# Patient Record
Sex: FEMALE | URBAN - METROPOLITAN AREA
[De-identification: names, ages, dates, MRNs, and addresses within clinical notes are randomized per-mention and may not be internally consistent; named-entity substitution may affect disease eponyms.]

---

## 2024-01-25 ENCOUNTER — INPATIENT (INPATIENT)
Facility: HOSPITAL | Age: 89
LOS: 3 days | Discharge: HOME | DRG: 592 | End: 2024-01-29
Attending: STUDENT IN AN ORGANIZED HEALTH CARE EDUCATION/TRAINING PROGRAM | Admitting: STUDENT IN AN ORGANIZED HEALTH CARE EDUCATION/TRAINING PROGRAM
Payer: MEDICARE

## 2024-01-25 VITALS
OXYGEN SATURATION: 95 % | RESPIRATION RATE: 14 BRPM | DIASTOLIC BLOOD PRESSURE: 65 MMHG | TEMPERATURE: 98 F | HEART RATE: 60 BPM | SYSTOLIC BLOOD PRESSURE: 113 MMHG

## 2024-01-25 PROCEDURE — 99285 EMERGENCY DEPT VISIT HI MDM: CPT

## 2024-01-25 NOTE — ED ADULT TRIAGE NOTE - CHIEF COMPLAINT QUOTE
Pt sent by family for wound check of the right foot. Family states wound is down to the bone. Pt is bedbound. Pt is nonverbal.

## 2024-01-26 DIAGNOSIS — S91.309A UNSPECIFIED OPEN WOUND, UNSPECIFIED FOOT, INITIAL ENCOUNTER: ICD-10-CM

## 2024-01-26 DIAGNOSIS — Z29.9 ENCOUNTER FOR PROPHYLACTIC MEASURES, UNSPECIFIED: ICD-10-CM

## 2024-01-26 DIAGNOSIS — S91.301A UNSPECIFIED OPEN WOUND, RIGHT FOOT, INITIAL ENCOUNTER: ICD-10-CM

## 2024-01-26 DIAGNOSIS — R41.82 ALTERED MENTAL STATUS, UNSPECIFIED: ICD-10-CM

## 2024-01-26 LAB
ALBUMIN SERPL ELPH-MCNC: 2.9 G/DL — LOW (ref 3.5–5)
ALP SERPL-CCNC: 134 U/L — HIGH (ref 40–120)
ALT FLD-CCNC: 43 U/L DA — SIGNIFICANT CHANGE UP (ref 10–60)
ANION GAP SERPL CALC-SCNC: 5 MMOL/L — SIGNIFICANT CHANGE UP (ref 5–17)
AST SERPL-CCNC: 33 U/L — SIGNIFICANT CHANGE UP (ref 10–40)
BASOPHILS # BLD AUTO: 0.05 K/UL — SIGNIFICANT CHANGE UP (ref 0–0.2)
BASOPHILS NFR BLD AUTO: 0.6 % — SIGNIFICANT CHANGE UP (ref 0–2)
BILIRUB SERPL-MCNC: 0.2 MG/DL — SIGNIFICANT CHANGE UP (ref 0.2–1.2)
BUN SERPL-MCNC: 16 MG/DL — SIGNIFICANT CHANGE UP (ref 7–18)
CALCIUM SERPL-MCNC: 8.9 MG/DL — SIGNIFICANT CHANGE UP (ref 8.4–10.5)
CHLORIDE SERPL-SCNC: 108 MMOL/L — SIGNIFICANT CHANGE UP (ref 96–108)
CO2 SERPL-SCNC: 26 MMOL/L — SIGNIFICANT CHANGE UP (ref 22–31)
CREAT SERPL-MCNC: 0.5 MG/DL — SIGNIFICANT CHANGE UP (ref 0.5–1.3)
EGFR: 90 ML/MIN/1.73M2 — SIGNIFICANT CHANGE UP
EOSINOPHIL # BLD AUTO: 0.1 K/UL — SIGNIFICANT CHANGE UP (ref 0–0.5)
EOSINOPHIL NFR BLD AUTO: 1.3 % — SIGNIFICANT CHANGE UP (ref 0–6)
ERYTHROCYTE [SEDIMENTATION RATE] IN BLOOD: 21 MM/HR — HIGH (ref 0–20)
GLUCOSE SERPL-MCNC: 126 MG/DL — HIGH (ref 70–99)
HCT VFR BLD CALC: 30.9 % — LOW (ref 34.5–45)
HGB BLD-MCNC: 9.8 G/DL — LOW (ref 11.5–15.5)
IMM GRANULOCYTES NFR BLD AUTO: 0.3 % — SIGNIFICANT CHANGE UP (ref 0–0.9)
LIDOCAIN IGE QN: 34 U/L — SIGNIFICANT CHANGE UP (ref 13–75)
LYMPHOCYTES # BLD AUTO: 1.11 K/UL — SIGNIFICANT CHANGE UP (ref 1–3.3)
LYMPHOCYTES # BLD AUTO: 14.1 % — SIGNIFICANT CHANGE UP (ref 13–44)
MCHC RBC-ENTMCNC: 25.8 PG — LOW (ref 27–34)
MCHC RBC-ENTMCNC: 31.7 GM/DL — LOW (ref 32–36)
MCV RBC AUTO: 81.3 FL — SIGNIFICANT CHANGE UP (ref 80–100)
MONOCYTES # BLD AUTO: 0.43 K/UL — SIGNIFICANT CHANGE UP (ref 0–0.9)
MONOCYTES NFR BLD AUTO: 5.5 % — SIGNIFICANT CHANGE UP (ref 2–14)
NEUTROPHILS # BLD AUTO: 6.16 K/UL — SIGNIFICANT CHANGE UP (ref 1.8–7.4)
NEUTROPHILS NFR BLD AUTO: 78.2 % — HIGH (ref 43–77)
NRBC # BLD: 0 /100 WBCS — SIGNIFICANT CHANGE UP (ref 0–0)
PLATELET # BLD AUTO: 234 K/UL — SIGNIFICANT CHANGE UP (ref 150–400)
POTASSIUM SERPL-MCNC: 3.9 MMOL/L — SIGNIFICANT CHANGE UP (ref 3.5–5.3)
POTASSIUM SERPL-SCNC: 3.9 MMOL/L — SIGNIFICANT CHANGE UP (ref 3.5–5.3)
PROT SERPL-MCNC: 6 G/DL — SIGNIFICANT CHANGE UP (ref 6–8.3)
RBC # BLD: 3.8 M/UL — SIGNIFICANT CHANGE UP (ref 3.8–5.2)
RBC # FLD: 14.8 % — HIGH (ref 10.3–14.5)
SODIUM SERPL-SCNC: 139 MMOL/L — SIGNIFICANT CHANGE UP (ref 135–145)
WBC # BLD: 7.87 K/UL — SIGNIFICANT CHANGE UP (ref 3.8–10.5)
WBC # FLD AUTO: 7.87 K/UL — SIGNIFICANT CHANGE UP (ref 3.8–10.5)

## 2024-01-26 PROCEDURE — 73620 X-RAY EXAM OF FOOT: CPT | Mod: 26,RT

## 2024-01-26 PROCEDURE — 99222 1ST HOSP IP/OBS MODERATE 55: CPT | Mod: GC

## 2024-01-26 RX ORDER — ACETAMINOPHEN 500 MG
650 TABLET ORAL EVERY 6 HOURS
Refills: 0 | Status: DISCONTINUED | OUTPATIENT
Start: 2024-01-26 | End: 2024-01-27

## 2024-01-26 RX ORDER — LANOLIN ALCOHOL/MO/W.PET/CERES
3 CREAM (GRAM) TOPICAL AT BEDTIME
Refills: 0 | Status: DISCONTINUED | OUTPATIENT
Start: 2024-01-26 | End: 2024-01-27

## 2024-01-26 RX ORDER — ENOXAPARIN SODIUM 100 MG/ML
40 INJECTION SUBCUTANEOUS EVERY 24 HOURS
Refills: 0 | Status: DISCONTINUED | OUTPATIENT
Start: 2024-01-26 | End: 2024-01-27

## 2024-01-26 RX ORDER — SODIUM CHLORIDE 9 MG/ML
1000 INJECTION, SOLUTION INTRAVENOUS
Refills: 0 | Status: DISCONTINUED | OUTPATIENT
Start: 2024-01-26 | End: 2024-01-27

## 2024-01-26 RX ORDER — ONDANSETRON 8 MG/1
4 TABLET, FILM COATED ORAL EVERY 8 HOURS
Refills: 0 | Status: DISCONTINUED | OUTPATIENT
Start: 2024-01-26 | End: 2024-01-29

## 2024-01-26 RX ADMIN — SODIUM CHLORIDE 60 MILLILITER(S): 9 INJECTION, SOLUTION INTRAVENOUS at 14:56

## 2024-01-26 RX ADMIN — ENOXAPARIN SODIUM 40 MILLIGRAM(S): 100 INJECTION SUBCUTANEOUS at 14:58

## 2024-01-26 NOTE — H&P ADULT - ATTENDING COMMENTS
IMAGING  Right Foot X-Ray  Pending official read; on my read there appears to be mild periosteal reaction appearance at the first metatarsal head.    HPI  88 year old female patient with unknown pmhx, nonverbal who presented to the ER sent by family for wound check of her right foot.  Per Triage note, Family stated wound is down to the bone. Family left without leaving contact information.    Physical Exam  General: Awake, Alert, Tracking with her eyes, Nonverbal, Not following commands  Cardiac: RRR, +3/6 left upper border sternal murmur  Pulmonary: CTA b/l  Abdominal: Soft, ND, NT  Extremities: No edema, Left Foot without ulcers, +1 DP pulses, Right Foot with 3 mm stage 4 ulcer on bottom of first metatarsal head with pus on gauze but clean-based appearance, Contracted extremities    A/P  # Right Plantar First Metatarsal Head Foot Ulcer  Wound is very deep with some purulence on gauze, but appears clean based  - Consult Podiatry  - TRISTEN/PVR and if positive consult Vascular  - Check A1C  - Follow up ESR, CRP  - Will hold off antibiotics as patient is not septic in case bone biopsy may be needed  - Consult Social Work to determine family contact information in order to determine for information about the patient    # DVT PPx  - Lovenox    # FEN  - Speech and Swallow evaluation  - Monitor and replete electrolytes as needed  - IV Fluid Hydration  - Aspiration Precautions    Patient case and management was discussed with ER Attending  I did examine all labs and imaging IMAGING  Right Foot X-Ray  Pending official read; on my read there appears to be mild periosteal reaction appearance at the first metatarsal head.    HPI  88 year old female patient with unknown pmhx, nonverbal who presented to the ER sent by family for wound check of her right foot.  Per Triage note, Family stated wound is down to the bone. Family left without leaving contact information.    Physical Exam  General: Awake, Alert, Tracking with her eyes, Nonverbal, Not following commands  Cardiac: RRR, +3/6 left upper border sternal murmur  Pulmonary: CTA b/l  Abdominal: Soft, ND, NT  Extremities: No edema, Left Foot without ulcers, +1 DP pulses, Right Foot with 3 mm stage 4 ulcer on bottom of first metatarsal head with pus on gauze but clean-based appearance, Contracted extremities    A/P  # Right Plantar First Metatarsal Head Foot Ulcer, r/o Osteomyelitis  Wound is very deep with some purulence on gauze, but appears clean based  - Consult Podiatry  - TRISTEN/PVR and if positive consult Vascular  - Check A1C  - Follow up ESR, CRP  - Will hold off antibiotics as patient is not septic in case bone biopsy may be needed  - Noncontrast MRI of Right Foot to r/o Osteomyelitis  - Consult Social Work to determine family contact information in order to determine for information about the patient    # DVT PPx  - Lovenox    # FEN  - Speech and Swallow evaluation  - Monitor and replete electrolytes as needed  - IV Fluid Hydration  - Aspiration Precautions    Patient case and management was discussed with ER Attending  I did examine all labs and imaging IMAGING  Right Foot X-Ray  Pending official read; on my read there appears to be mild periosteal reaction appearance at the first metatarsal head.    HPI  88 year old female patient with unknown pmhx, nonverbal who presented to the ER sent by family for wound check of her right foot.  Per Triage note, Family stated wound is down to the bone. Family left without leaving contact information.    Physical Exam  General: Awake, Alert, Tracking with her eyes, Nonverbal, Not following commands  Cardiac: RRR, +3/6 left upper border sternal murmur  Pulmonary: CTA b/l  Abdominal: Soft, ND, NT  Extremities: No edema, Left Foot without ulcers, +1 DP pulses, Right Foot with 3 mm stage 4 ulcer on bottom of first metatarsal head with pus on gauze but clean-based appearance, Contracted extremities    A/P  # Right Plantar First Metatarsal Head Foot Ulcer, r/o Osteomyelitis  Wound is very deep with some purulence on gauze, but appears clean based  - Consult Podiatry  - TRISTEN/PVR and if positive consult Vascular  - Check A1C  - Follow up ESR, CRP  - Will hold off antibiotics as patient is not septic in case bone biopsy may be needed  - Noncontrast MRI of Right Foot to r/o Osteomyelitis  - Consult Social Work to determine family contact information in order to determine more information about the patient    # DVT PPx  - Lovenox    # FEN  - Speech and Swallow evaluation  - Monitor and replete electrolytes as needed  - IV Fluid Hydration  - Aspiration Precautions    Patient case and management was discussed with ER Attending  I did examine all labs and imaging

## 2024-01-26 NOTE — H&P ADULT - HISTORY OF PRESENT ILLNESS
88F with unknown PMH presented to the ED with a foot wound. Pt seen at bedside, AAOX0, nonverbal. As per ED chart and signout, pt was dropped off by family but there is not contact info for the family that was given. Pt appears comfortable, afeb, VSS Unable to assess ROS due to mental status

## 2024-01-26 NOTE — CHART NOTE - NSCHARTNOTEFT_GEN_A_CORE
NP received call from [facility] Pharmacist-Caludia with recommendation for Lovenox 30mg QD.  NP verbalized understanding and informed Attending. NP received call from [facility] Marielos with recommendation for Lovenox 30mg QD.  NP verbalized understanding and informed Attending.

## 2024-01-26 NOTE — PROGRESS NOTE ADULT - SUBJECTIVE AND OBJECTIVE BOX
HPI:  88F with unknown PMH presented to the ED with a foot wound. Pt seen at bedside, AAOX0, nonverbal. As per ED chart and signout, pt was dropped off by family but there is not contact info for the family that was given. Pt appears comfortable, afeb. Unable to obtain full history due to mental status (26 Jan 2024 06:13)      Podiatry HPI    PMH:  Allergies: Diucardin (Unknown)      Medications acetaminophen     Tablet .. 650 milliGRAM(s) Oral every 6 hours PRN  aluminum hydroxide/magnesium hydroxide/simethicone Suspension 30 milliLiter(s) Oral every 4 hours PRN  enoxaparin Injectable 40 milliGRAM(s) SubCutaneous every 24 hours  lactated ringers. 1000 milliLiter(s) IV Continuous <Continuous>  melatonin 3 milliGRAM(s) Oral at bedtime PRN  ondansetron Injectable 4 milliGRAM(s) IV Push every 8 hours PRN    FH,   PMH   PSH    Labs                          9.8    7.87  )-----------( 234      ( 26 Jan 2024 04:34 )             30.9      01-26    139  |  108  |  16  ----------------------------<  126<H>  3.9   |  26  |  0.50    Ca    8.9      26 Jan 2024 04:34    TPro  6.0  /  Alb  2.9<L>  /  TBili  0.2  /  DBili  x   /  AST  33  /  ALT  43  /  AlkPhos  134<H>  01-26     Vital Signs Last 24 Hrs  T(C): 36.6 (26 Jan 2024 11:15), Max: 36.8 (25 Jan 2024 20:26)  T(F): 97.9 (26 Jan 2024 11:15), Max: 98.3 (25 Jan 2024 20:26)  HR: 58 (26 Jan 2024 11:15) (58 - 65)  BP: 161/64 (26 Jan 2024 11:15) (113/65 - 161/64)  BP(mean): --  RR: 17 (26 Jan 2024 11:15) (14 - 18)  SpO2: 96% (26 Jan 2024 11:15) (95% - 96%)    Parameters below as of 26 Jan 2024 11:15  Patient On (Oxygen Delivery Method): room air      Sedimentation Rate, Erythrocyte: 21 mm/Hr (01-26-24 @ 04:34)          WBC Count: 7.87 K/uL (01-26-24 @ 04:34)      PHYSICAL EXAM  LE Focused:    Derm: Wound to the 1st R submet measuring 2x2cm. Fibrogranular wound bed. Does not probe to bone. No erythema. No drainage or purulence.  Vasc: Faintly palpable pulses. Skin temperature noted to be warm to warm from proximal to distal b/l.   Neuro: Protective and epicritic sensation grossly intact   Musc: Contracted b/l LE. Muscle test deferred.     A:  1st  submet wound    P:   Patient seen and evaluated at bedside and Chart reviewed.  No leukocytosis with vitals stable.  Discussed diagnosis and treatment with patient  X-rays evaluated-final read  Obtained wound culture and sent to Lab  Dressed with santyl and DSD.  Patient to keep the dressing clean, dry and intact  Discussed importance of daily foot examination and proper shoe gear.  Recommended ID consult   Recommended Vasc consult if TRISTEN/PVR abnormal for healing potential of wound.   Ordered TRISTEN/TBI/PVR   Ordered HbA1C  Ordered ESR/CRP  Discussed importance of daily foot examinations and proper shoe gear and to importance of lower Fasting Blood Glucose levels.   Podiatry to follow while in house.  Discussed with Dr. Hale

## 2024-01-26 NOTE — H&P ADULT - PROBLEM SELECTOR PLAN 1
presenting with 2-3mm stage 4 ulcer on plantar aspect of right foot metatarsal with purulent drainage  unable to obtain collateral history, no contact info for family  afeb, VSS on admission  WBC 7.87  Podiatry consulted  will hold abx for now  f/u ESR/CRP  f/u MRI right  f/u TRISTEN/PVR  f/u A1c  gentle hydration, NPO for now due to mental status, dysphagia screen

## 2024-01-26 NOTE — H&P ADULT - NSHPPHYSICALEXAM_GEN_ALL_CORE
General - NAD, lying in bed comfortably, appears comfortable, nonverbal  Eyes - PERRLA, EOM intact  ENT - Nonicteric sclerae, PERRLA, EOMI. Oropharynx clear. Moist mucous membranes. Conjunctivae appear well perfused.   Neck - No noticeable or palpable swelling, redness or rash around throat or on face  Lymph Nodes - No lymphadenopathy  Cardiovascular - (+) systolic murmur heard. RRR.  no JVD, no carotid bruits  Lungs - Clear to auscultation, no use of accessory muscles, no crackles or wheezes.  Skin - (+) 1st metatarsal, plantar aspect of right foot ulcer, 2-3mm, stage 4, with purulent drainage.   Abdomen - Normal bowel sounds, abdomen soft and nontender  Rectal – Rectal exam not performed since no symptoms indicated blood loss.  Extremities - No edema, cyanosis or clubbing  Musculoskeletal - 5/5 strength, normal range of motion, no swollen or erythematous joints.  Neuro– Alert and oriented x 3, CN 2-12 grossly intact.

## 2024-01-26 NOTE — CONSULT NOTE ADULT - SUBJECTIVE AND OBJECTIVE BOX
HPI:  88F with unknown PMH presented to the ED with a foot wound. Pt seen at bedside, AAOX0, nonverbal. As per ED chart and signout, pt was dropped off by family but there is not contact info for the family that was given. Pt appears comfortable, afeb. Unable to obtain full history due to mental status (26 Jan 2024 06:13)      Podiatry HPI    PMH:  Allergies: Diucardin (Unknown)      Medications acetaminophen     Tablet .. 650 milliGRAM(s) Oral every 6 hours PRN  aluminum hydroxide/magnesium hydroxide/simethicone Suspension 30 milliLiter(s) Oral every 4 hours PRN  enoxaparin Injectable 40 milliGRAM(s) SubCutaneous every 24 hours  lactated ringers. 1000 milliLiter(s) IV Continuous <Continuous>  melatonin 3 milliGRAM(s) Oral at bedtime PRN  ondansetron Injectable 4 milliGRAM(s) IV Push every 8 hours PRN    FH,   PMH   PSH    Labs                          9.8    7.87  )-----------( 234      ( 26 Jan 2024 04:34 )             30.9      01-26    139  |  108  |  16  ----------------------------<  126<H>  3.9   |  26  |  0.50    Ca    8.9      26 Jan 2024 04:34    TPro  6.0  /  Alb  2.9<L>  /  TBili  0.2  /  DBili  x   /  AST  33  /  ALT  43  /  AlkPhos  134<H>  01-26     Vital Signs Last 24 Hrs  T(C): 36.6 (26 Jan 2024 11:15), Max: 36.8 (25 Jan 2024 20:26)  T(F): 97.9 (26 Jan 2024 11:15), Max: 98.3 (25 Jan 2024 20:26)  HR: 58 (26 Jan 2024 11:15) (58 - 65)  BP: 161/64 (26 Jan 2024 11:15) (113/65 - 161/64)  BP(mean): --  RR: 17 (26 Jan 2024 11:15) (14 - 18)  SpO2: 96% (26 Jan 2024 11:15) (95% - 96%)    Parameters below as of 26 Jan 2024 11:15  Patient On (Oxygen Delivery Method): room air      Sedimentation Rate, Erythrocyte: 21 mm/Hr (01-26-24 @ 04:34)          WBC Count: 7.87 K/uL (01-26-24 @ 04:34)      PHYSICAL EXAM  LE Focused:    Derm: Wound to the 1st R submet measuring 2x2cm. Fibrogranular wound bed. Does not probe to bone. No erythema. No drainage or purulence.  Vasc: Faintly palpable pulses. Skin temperature noted to be warm to warm from proximal to distal b/l.   Neuro: Protective and epicritic sensation grossly intact   Musc: Contracted b/l LE. Muscle test deferred.     A:  1st  submet wound    P:   Patient seen and evaluated at bedside and Chart reviewed.  No leukocytosis with vitals stable.  Discussed diagnosis and treatment with patient  X-rays evaluated-final read  Obtained wound culture and sent to Lab  Dressed with santyl and DSD.  Patient to keep the dressing clean, dry and intact  Discussed importance of daily foot examination and proper shoe gear.  Recommended ID consult   Recommended Vasc consult if TRISTEN/PVR abnormal for healing potential of wound.   Ordered TRISTEN/TBI/PVR   Ordered HbA1C  Ordered ESR/CRP  Discussed importance of daily foot examinations and proper shoe gear and to importance of lower Fasting Blood Glucose levels.   Podiatry to follow while in house.  Seen with Dr. Hale

## 2024-01-26 NOTE — CHART NOTE - NSCHARTNOTEFT_GEN_A_CORE
EVENT:  Pt seen and examined     SUBJECTIVE: Mouthed hello, nonverbal, edentulous, w/ LE contracture.      OBJECTIVE  REVIEW OF SYSTEMS:  Limited exam in nonverbal pt     CONSTITUTIONAL: No fever  EYES: No acute visual disturbances  NECK: No pain or stiffness  RESPIRATORY: No cough; No shortness of breath  CARDIOVASCULAR: No chest pain, no palpitations  GASTROINTESTINAL: No pain. No nausea or vomiting.  No diarrhea   NEUROLOGICAL: No headache or numbness, no tremors  MUSCULOSKELETAL: No joint pain, no muscle pain  GENITOURINARY: No dysuria, no frequency, no hesitancy  PSYCHIATRY: No depression, no anxiety  ALL OTHER  ROS negative      PHYSICAL EXAM: Limited exam in nonverbal pt   GENERAL: NAD, Fail appearing   HEENT: Normocephalic;  conjunctivae and sclerae clear; moist mucous membranes; Edentulous.    NECK : Supple  CHEST/LUNG: Clear to auscultation bilaterally with good air entry   HEART: S1 S2  regular; no murmurs, gallops or rubs  ABDOMEN: Soft, Nontender, Nondistended; Bowel sounds present x 4 quad  EXTREMITIES: No cyanosis; no edema; no calf tenderness.  (+) LE contracture.  (+) R. Foot wound w/ peggy dsg, no drainage noted.    SKIN: Warm and dry; no rash  NERVOUS SYSTEM:  Aroused to touch       Vital Signs Last 24 Hrs  T(C): 36.8 (26 Jan 2024 12:35), Max: 36.8 (25 Jan 2024 20:26)  T(F): 98.3 (26 Jan 2024 12:35), Max: 98.3 (25 Jan 2024 20:26)  HR: 65 (26 Jan 2024 12:35) (58 - 65)  BP: 152/65 (26 Jan 2024 12:35) (113/65 - 161/64)  RR: 17 (26 Jan 2024 12:35) (14 - 18)  SpO2: 98% (26 Jan 2024 12:35) (95% - 98%)    Parameters below as of 26 Jan 2024 12:35  Patient On (Oxygen Delivery Method): room air        LABS:                        9.8    7.87  )-----------( 234      ( 26 Jan 2024 04:34 )             30.9     01-26    139  |  108  |  16  ----------------------------<  126<H>  3.9   |  26  |  0.50    Ca    8.9      26 Jan 2024 04:34    TPro  6.0  /  Alb  2.9<L>  /  TBili  0.2  /  DBili  x   /  AST  33  /  ALT  43  /  AlkPhos  134<H>  01-26      EKG:   IMAGING:    ASSESSMENT:  88 year old, Female, with unknown PMH.  Presented to the ED, reportedly dropped off by family member for right foot wound.  Admitted for Right  foot ulcer.      Med req needed-f/u med rec        Problem/Plan - 1:  ·  Problem: Open wound of right foot.   ·  Plan:   - P/w 2-3mm stage 4 ulcer on plantar aspect of right foot metatarsal with purulent drainage  - No family available for collateral history   - Xray showed Possible osteomyelitis, first metatarsal  - C/w wound care:  ---------------Dressed with santyl and DSD.  Podiatry to follow while in house.  - ABIs pending-f/u results   - Repeat ESR pending-f/u results   - CRP pending-f/u results   - MR pending-no family available to complete safety form  - Pod-Dr. Hale consulted, appreciated-Recommended ID consult        Problem/Plan - 2:  ·  Problem: AMS (altered mental status).   ·  Plan:   - P/w AAOx0, nonverbal on admission, unknown baseline mental status.  No family available for collateral history-Pt reportedly dropped off in ED by family member.  - C/w NPO d/t mental status.  C/w IVF.  Reassess in AM  - Awaiting dysphagia screen-f/u results   - SW consult pending-f/u recommendations     Problem/Plan - 3:  ·  Problem: Prophylactic measure.   ·  Plan:   - C/w Lovenox for DVT ppx EVENT:  Pt seen and examined     SUBJECTIVE: Mouthed hello, nonverbal, edentulous, w/ LE contracture.      OBJECTIVE  REVIEW OF SYSTEMS:  Limited exam in nonverbal pt     CONSTITUTIONAL: No fever  EYES: No acute visual disturbances  NECK: No pain or stiffness  RESPIRATORY: No cough; No shortness of breath  CARDIOVASCULAR: No chest pain, no palpitations  GASTROINTESTINAL: No pain. No nausea or vomiting.  No diarrhea   NEUROLOGICAL: No headache or numbness, no tremors  MUSCULOSKELETAL: No joint pain, no muscle pain  GENITOURINARY: No dysuria, no frequency, no hesitancy  PSYCHIATRY: No depression, no anxiety  ALL OTHER  ROS negative      PHYSICAL EXAM: Limited exam in nonverbal pt   GENERAL: NAD, Fail appearing   HEENT: Normocephalic;  conjunctivae and sclerae clear; moist mucous membranes; Edentulous.    NECK : Supple  CHEST/LUNG: Clear to auscultation bilaterally with good air entry   HEART: S1 S2  regular; no murmurs, gallops or rubs  ABDOMEN: Soft, Nontender, Nondistended; Bowel sounds present x 4 quad  EXTREMITIES: No cyanosis; no edema; no calf tenderness.  (+) LE contracture.  (+) R. Foot wound w/ peggy dsg, no drainage noted.    SKIN: Warm and dry; no rash  NERVOUS SYSTEM:  Aroused to touch       Vital Signs Last 24 Hrs  T(C): 36.8 (26 Jan 2024 12:35), Max: 36.8 (25 Jan 2024 20:26)  T(F): 98.3 (26 Jan 2024 12:35), Max: 98.3 (25 Jan 2024 20:26)  HR: 65 (26 Jan 2024 12:35) (58 - 65)  BP: 152/65 (26 Jan 2024 12:35) (113/65 - 161/64)  RR: 17 (26 Jan 2024 12:35) (14 - 18)  SpO2: 98% (26 Jan 2024 12:35) (95% - 98%)    Parameters below as of 26 Jan 2024 12:35  Patient On (Oxygen Delivery Method): room air        LABS:                        9.8    7.87  )-----------( 234      ( 26 Jan 2024 04:34 )             30.9     01-26    139  |  108  |  16  ----------------------------<  126<H>  3.9   |  26  |  0.50    Ca    8.9      26 Jan 2024 04:34    TPro  6.0  /  Alb  2.9<L>  /  TBili  0.2  /  DBili  x   /  AST  33  /  ALT  43  /  AlkPhos  134<H>  01-26      EKG:   IMAGING:    ASSESSMENT:  88 year old, Female, with unknown PMH.  Presented to the ED, reportedly dropped off by family member for right foot wound.  Admitted for Right foot ulcer.      Med req needed-f/u med rec        Problem/Plan - 1:  ·  Problem: Open wound of right foot.   ·  Plan:   - P/w 2-3mm stage 4 ulcer on plantar aspect of right foot metatarsal with purulent drainage  - No family available for collateral history   - Xray showed Possible osteomyelitis, first metatarsal  - C/w wound care:  ---------------Dressed with santyl and DSD.  Podiatry to follow while in house.  - ABIs pending-f/u results   - Repeat ESR pending-f/u results   - CRP pending-f/u results   - MR pending-no family available to complete safety form  - Pod-Dr. Hale consulted, appreciated-Recommended ID consult        Problem/Plan - 2:  ·  Problem: AMS (altered mental status).   ·  Plan:   - P/w AAOx0, nonverbal on admission, unknown baseline mental status.  No family available for collateral history-Pt reportedly dropped off in ED by family member.  - C/w NPO d/t mental status.  C/w IVF.  Reassess in AM  - Awaiting dysphagia screen-f/u results   - SW consult pending-f/u recommendations     Problem/Plan - 3:  ·  Problem: Prophylactic measure.   ·  Plan:   - C/w Lovenox for DVT ppx EVENT:  Pt seen and examined     SUBJECTIVE: Mouthed hello, nonverbal, edentulous, w/ LE contracture.      OBJECTIVE  REVIEW OF SYSTEMS:  Limited exam in nonverbal pt     CONSTITUTIONAL: No fever  EYES: No acute visual disturbances  NECK: No pain or stiffness  RESPIRATORY: No cough; No shortness of breath  CARDIOVASCULAR: No chest pain, no palpitations  GASTROINTESTINAL: No pain. No nausea or vomiting.  No diarrhea   NEUROLOGICAL: No headache or numbness, no tremors  MUSCULOSKELETAL: No joint pain, no muscle pain  GENITOURINARY: No dysuria, no frequency, no hesitancy  PSYCHIATRY: No depression, no anxiety  ALL OTHER  ROS negative      PHYSICAL EXAM: Limited exam in nonverbal pt   GENERAL: NAD, Fail appearing   HEENT: Normocephalic;  conjunctivae and sclerae clear; moist mucous membranes; Edentulous.    NECK : Supple  CHEST/LUNG: Clear to auscultation bilaterally with good air entry   HEART: S1 S2  regular; no murmurs, gallops or rubs  ABDOMEN: Soft, Nontender, Nondistended; Bowel sounds present x 4 quad  EXTREMITIES: No cyanosis; no edema; no calf tenderness.  (+) LE contracture.  (+) R. Foot wound w/ peggy dsg, no drainage noted.    SKIN: Warm and dry; no rash  NERVOUS SYSTEM:  Aroused to touch       Vital Signs Last 24 Hrs  T(C): 36.8 (26 Jan 2024 12:35), Max: 36.8 (25 Jan 2024 20:26)  T(F): 98.3 (26 Jan 2024 12:35), Max: 98.3 (25 Jan 2024 20:26)  HR: 65 (26 Jan 2024 12:35) (58 - 65)  BP: 152/65 (26 Jan 2024 12:35) (113/65 - 161/64)  RR: 17 (26 Jan 2024 12:35) (14 - 18)  SpO2: 98% (26 Jan 2024 12:35) (95% - 98%)    Parameters below as of 26 Jan 2024 12:35  Patient On (Oxygen Delivery Method): room air        LABS:                        9.8    7.87  )-----------( 234      ( 26 Jan 2024 04:34 )             30.9     01-26    139  |  108  |  16  ----------------------------<  126<H>  3.9   |  26  |  0.50    Ca    8.9      26 Jan 2024 04:34    TPro  6.0  /  Alb  2.9<L>  /  TBili  0.2  /  DBili  x   /  AST  33  /  ALT  43  /  AlkPhos  134<H>  01-26      EKG:   IMAGING:    ASSESSMENT:  88 year old, Female, with unknown PMH.  Presented to the ED, reportedly dropped off by family member for right foot wound.  Admitted for Right foot ulcer.      Med req needed-f/u med rec        Problem/Plan - 1:  ·  Problem: Open wound of right foot.   ·  Plan:   - P/w 2-3mm stage 4 ulcer on plantar aspect of right foot metatarsal with purulent drainage  - No family available for collateral history   - Xray showed possible osteomyelitis, first metatarsal  - C/w wound care:  ---------------Dressed with santyl and DSD.  Podiatry to follow while in house.  - ABIs pending-f/u results   - Repeat ESR pending-f/u results   - CRP pending-f/u results   - MR pending-no family available to complete safety form  - Pod-Dr. Hale consulted, appreciated-Recommended ID consult        Problem/Plan - 2:  ·  Problem: AMS (altered mental status).   ·  Plan:   - P/w AAOx0, nonverbal on admission, unknown baseline mental status.  No family available for collateral history-Pt reportedly dropped off in ED by family member.  - C/w NPO d/t mental status.  C/w IVF.  Reassess in AM  - Awaiting dysphagia screen-f/u results   - SW consult pending-f/u recommendations     Problem/Plan - 3:  ·  Problem: Prophylactic measure.   ·  Plan:   - C/w Lovenox for DVT ppx EVENT:  Pt seen and examined     SUBJECTIVE: Mouthed hello, nonverbal, edentulous, w/ LE contracture.      OBJECTIVE  REVIEW OF SYSTEMS:  Limited exam in nonverbal pt     CONSTITUTIONAL: No fever  EYES: No acute visual disturbances  NECK: No pain or stiffness  RESPIRATORY: No cough; No shortness of breath  CARDIOVASCULAR: No chest pain, no palpitations  GASTROINTESTINAL: No pain. No nausea or vomiting.  No diarrhea   NEUROLOGICAL: No headache or numbness, no tremors  MUSCULOSKELETAL: No joint pain, no muscle pain  GENITOURINARY: No dysuria, no frequency, no hesitancy  PSYCHIATRY: No depression, no anxiety  ALL OTHER  ROS negative      PHYSICAL EXAM: Limited exam in nonverbal pt   GENERAL: NAD, Fail appearing   HEENT: Normocephalic;  conjunctivae and sclerae clear; moist mucous membranes; Edentulous.    NECK : Supple  CHEST/LUNG: Clear to auscultation bilaterally with good air entry   HEART: S1 S2  regular; no murmurs, gallops or rubs  ABDOMEN: Soft, Nontender, Nondistended; Bowel sounds present x 4 quad  EXTREMITIES: No cyanosis; no edema; no calf tenderness.  (+) LE contracture.  (+) R. Foot wound w/ peggy dsg, no drainage noted.    SKIN: Warm and dry; (+) Multiple pressure ulcers   NERVOUS SYSTEM:  Aroused to touch       Vital Signs Last 24 Hrs  T(C): 36.8 (26 Jan 2024 12:35), Max: 36.8 (25 Jan 2024 20:26)  T(F): 98.3 (26 Jan 2024 12:35), Max: 98.3 (25 Jan 2024 20:26)  HR: 65 (26 Jan 2024 12:35) (58 - 65)  BP: 152/65 (26 Jan 2024 12:35) (113/65 - 161/64)  RR: 17 (26 Jan 2024 12:35) (14 - 18)  SpO2: 98% (26 Jan 2024 12:35) (95% - 98%)    Parameters below as of 26 Jan 2024 12:35  Patient On (Oxygen Delivery Method): room air        LABS:                        9.8    7.87  )-----------( 234      ( 26 Jan 2024 04:34 )             30.9     01-26    139  |  108  |  16  ----------------------------<  126<H>  3.9   |  26  |  0.50    Ca    8.9      26 Jan 2024 04:34    TPro  6.0  /  Alb  2.9<L>  /  TBili  0.2  /  DBili  x   /  AST  33  /  ALT  43  /  AlkPhos  134<H>  01-26      EKG:   IMAGING:    ASSESSMENT:  88 year old, Female, with unknown PMH.  Presented to the ED, reportedly dropped off by family member for right foot wound.  Admitted for Right foot ulcer.      Med req needed-f/u med rec        Problem/Plan - 1:  ·  Problem: Open wound of right foot.   ·  Plan:   - P/w 2-3mm stage 4 ulcer on plantar aspect of right foot metatarsal with purulent drainage  - No family available for collateral history   - Xray showed possible osteomyelitis, first metatarsal  - C/w wound care:  ---------------Dressed with santyl and DSD.  Podiatry to follow while in house.  - ABIs pending-f/u results   - Repeat ESR pending-f/u results   - CRP pending-f/u results   - MR pending-no family available to complete safety form  - Pod-Dr. Hale consulted, appreciated-Recommended ID consult      Problem/Plan - 2:  ·  Problem: Pressure ulcers.   ·  Plan:   - P/w Multiple pressure ulcers  - Continue to off load  - Wound care consult pending-f/u recommendations      Problem/Plan - 3:  ·  Problem: AMS (altered mental status).   ·  Plan:   - P/w AAOx0, nonverbal on admission, unknown baseline mental status.  No family available for collateral history-Pt reportedly dropped off in ED by family member.  - C/w NPO d/t mental status.  C/w IVF.  Reassess in AM  - Awaiting dysphagia screen-f/u results   - SW consult pending-f/u recommendations     Problem/Plan - 4:  ·  Problem: Prophylactic measure.   ·  Plan:   - C/w Lovenox for DVT ppx

## 2024-01-26 NOTE — ED ADULT NURSE NOTE - NSFALLRISKINTERV_ED_ALL_ED

## 2024-01-26 NOTE — PATIENT PROFILE ADULT - FALL HARM RISK - HARM RISK INTERVENTIONS
Assistance with ambulation/Assistance OOB with selected safe patient handling equipment/Communicate Risk of Fall with Harm to all staff/Monitor for mental status changes/Monitor gait and stability/Reinforce activity limits and safety measures with patient and family/Reorient to person, place and time as needed/Review medications for side effects contributing to fall risk/Sit up slowly, dangle for a short time, stand at bedside before walking/Tailored Fall Risk Interventions/Use of alarms - bed, chair and/or voice tab/Visual Cue: Yellow wristband and red socks/Bed in lowest position, wheels locked, appropriate side rails in place/Call bell, personal items and telephone in reach/Instruct patient to call for assistance before getting out of bed or chair/Non-slip footwear when patient is out of bed/[place] to call system/Physically safe environment - no spills, clutter or unnecessary equipment/Purposeful Proactive Rounding/Room/bathroom lighting operational, light cord in reach/Unable to comprehend

## 2024-01-26 NOTE — ED ADULT NURSE REASSESSMENT NOTE - NS ED NURSE REASSESS COMMENT FT1
Pt endorsed from MEDARDO Gordillo, evaluated for wound check, pt in no acute distress, appears comfortable, alert but non verbal, breathes freely on room air, safety precautions inplace, vss, pending admission, nursing care continues. Pt endorsed from MEDARDO Gordillo, evaluated for wound check, pt in no acute distress, appears comfortable, alert but non verbal, breathes freely on room air,  wound noted to right foot, no drainage or odour with light pink bed wound, dressing applied, skin otherwise intact, safety precautions inplace, vss, pending admission, nursing care continues.

## 2024-01-26 NOTE — ED ADULT NURSE NOTE - NS ED NURSE RECORD ANOTHER HT AND WT
ULTRASOUND ABDOMEN LIMITED:

(RIGHT UPPER QUADRANT)

 

DATE 7/6/18.

 

HISTORY: 

A 53-year-old male with elevated liver function tests.

 

FINDINGS: 

Gallbladder: Surgically absent.

Common duct: 5 mm.

Liver: Diffusely increased echogenicity, but without significant signal dropout in the deep portions 
of the right lobe.  

Pancreas: Very poorly visualized due to a combination of shadowing from bowel gas and body habitus.

Right kidney: No hydronephrosis.

 

IMPRESSION: 

1.  Questionable hepatic steatosis.

 

2.  Status post cholecystectomy.

 

3.  Pancreas not visualized.

 

 

JN R

 

POS: TPC Yes

## 2024-01-26 NOTE — ED PROVIDER NOTE - OBJECTIVE STATEMENT
88-year-old female unknown medical hx (no documentation in chart, family not at bedside and no contact phone numbers available) sent in by family from home for a foot wound. Unknown how long this has been present. Patient is nonverbal, bedbound, contracted, unable to provide hx.

## 2024-01-26 NOTE — H&P ADULT - ASSESSMENT
88F with unknown PMH presented to the ED with a foot wound admitted for foot ulcer 88F with unknown PMH presented to the ED with a foot wound admitted for foot ulcer    PRIMARY TEAM TO PLEASE CONFIRM MED REC AND FAMILY CONTACT INFO

## 2024-01-27 LAB
A1C WITH ESTIMATED AVERAGE GLUCOSE RESULT: 5.7 % — HIGH (ref 4–5.6)
ALBUMIN SERPL ELPH-MCNC: 2.8 G/DL — LOW (ref 3.5–5)
ALP SERPL-CCNC: 119 U/L — SIGNIFICANT CHANGE UP (ref 40–120)
ALT FLD-CCNC: 34 U/L DA — SIGNIFICANT CHANGE UP (ref 10–60)
ANION GAP SERPL CALC-SCNC: 6 MMOL/L — SIGNIFICANT CHANGE UP (ref 5–17)
AST SERPL-CCNC: 20 U/L — SIGNIFICANT CHANGE UP (ref 10–40)
BASOPHILS # BLD AUTO: 0.03 K/UL — SIGNIFICANT CHANGE UP (ref 0–0.2)
BASOPHILS NFR BLD AUTO: 0.8 % — SIGNIFICANT CHANGE UP (ref 0–2)
BILIRUB SERPL-MCNC: 0.6 MG/DL — SIGNIFICANT CHANGE UP (ref 0.2–1.2)
BUN SERPL-MCNC: 8 MG/DL — SIGNIFICANT CHANGE UP (ref 7–18)
CALCIUM SERPL-MCNC: 9 MG/DL — SIGNIFICANT CHANGE UP (ref 8.4–10.5)
CHLORIDE SERPL-SCNC: 105 MMOL/L — SIGNIFICANT CHANGE UP (ref 96–108)
CO2 SERPL-SCNC: 26 MMOL/L — SIGNIFICANT CHANGE UP (ref 22–31)
CREAT SERPL-MCNC: 0.39 MG/DL — LOW (ref 0.5–1.3)
CRP SERPL-MCNC: <3 MG/L — SIGNIFICANT CHANGE UP
EGFR: 96 ML/MIN/1.73M2 — SIGNIFICANT CHANGE UP
EOSINOPHIL # BLD AUTO: 0.09 K/UL — SIGNIFICANT CHANGE UP (ref 0–0.5)
EOSINOPHIL NFR BLD AUTO: 2.3 % — SIGNIFICANT CHANGE UP (ref 0–6)
ERYTHROCYTE [SEDIMENTATION RATE] IN BLOOD: 21 MM/HR — HIGH (ref 0–20)
ESTIMATED AVERAGE GLUCOSE: 117 MG/DL — HIGH (ref 68–114)
GLUCOSE SERPL-MCNC: 86 MG/DL — SIGNIFICANT CHANGE UP (ref 70–99)
HCT VFR BLD CALC: 31.2 % — LOW (ref 34.5–45)
HGB BLD-MCNC: 10.3 G/DL — LOW (ref 11.5–15.5)
IMM GRANULOCYTES NFR BLD AUTO: 0.3 % — SIGNIFICANT CHANGE UP (ref 0–0.9)
LYMPHOCYTES # BLD AUTO: 0.76 K/UL — LOW (ref 1–3.3)
LYMPHOCYTES # BLD AUTO: 19.2 % — SIGNIFICANT CHANGE UP (ref 13–44)
MAGNESIUM SERPL-MCNC: 2.2 MG/DL — SIGNIFICANT CHANGE UP (ref 1.6–2.6)
MCHC RBC-ENTMCNC: 25.8 PG — LOW (ref 27–34)
MCHC RBC-ENTMCNC: 33 GM/DL — SIGNIFICANT CHANGE UP (ref 32–36)
MCV RBC AUTO: 78 FL — LOW (ref 80–100)
MONOCYTES # BLD AUTO: 0.25 K/UL — SIGNIFICANT CHANGE UP (ref 0–0.9)
MONOCYTES NFR BLD AUTO: 6.3 % — SIGNIFICANT CHANGE UP (ref 2–14)
NEUTROPHILS # BLD AUTO: 2.82 K/UL — SIGNIFICANT CHANGE UP (ref 1.8–7.4)
NEUTROPHILS NFR BLD AUTO: 71.1 % — SIGNIFICANT CHANGE UP (ref 43–77)
NRBC # BLD: 0 /100 WBCS — SIGNIFICANT CHANGE UP (ref 0–0)
PHOSPHATE SERPL-MCNC: 3.4 MG/DL — SIGNIFICANT CHANGE UP (ref 2.5–4.5)
PLATELET # BLD AUTO: 202 K/UL — SIGNIFICANT CHANGE UP (ref 150–400)
POTASSIUM SERPL-MCNC: 3.3 MMOL/L — LOW (ref 3.5–5.3)
POTASSIUM SERPL-SCNC: 3.3 MMOL/L — LOW (ref 3.5–5.3)
PROT SERPL-MCNC: 6 G/DL — SIGNIFICANT CHANGE UP (ref 6–8.3)
RBC # BLD: 4 M/UL — SIGNIFICANT CHANGE UP (ref 3.8–5.2)
RBC # FLD: 14.6 % — HIGH (ref 10.3–14.5)
SODIUM SERPL-SCNC: 137 MMOL/L — SIGNIFICANT CHANGE UP (ref 135–145)
WBC # BLD: 3.96 K/UL — SIGNIFICANT CHANGE UP (ref 3.8–10.5)
WBC # FLD AUTO: 3.96 K/UL — SIGNIFICANT CHANGE UP (ref 3.8–10.5)

## 2024-01-27 PROCEDURE — 99232 SBSQ HOSP IP/OBS MODERATE 35: CPT

## 2024-01-27 RX ORDER — SODIUM CHLORIDE 9 MG/ML
1000 INJECTION, SOLUTION INTRAVENOUS
Refills: 0 | Status: DISCONTINUED | OUTPATIENT
Start: 2024-01-27 | End: 2024-01-27

## 2024-01-27 RX ORDER — ENOXAPARIN SODIUM 100 MG/ML
30 INJECTION SUBCUTANEOUS EVERY 24 HOURS
Refills: 0 | Status: DISCONTINUED | OUTPATIENT
Start: 2024-01-27 | End: 2024-01-29

## 2024-01-27 RX ORDER — SODIUM CHLORIDE 9 MG/ML
1000 INJECTION, SOLUTION INTRAVENOUS
Refills: 0 | Status: DISCONTINUED | OUTPATIENT
Start: 2024-01-27 | End: 2024-01-28

## 2024-01-27 RX ORDER — PANTOPRAZOLE SODIUM 20 MG/1
40 TABLET, DELAYED RELEASE ORAL DAILY
Refills: 0 | Status: DISCONTINUED | OUTPATIENT
Start: 2024-01-27 | End: 2024-01-29

## 2024-01-27 RX ORDER — POTASSIUM CHLORIDE 20 MEQ
10 PACKET (EA) ORAL ONCE
Refills: 0 | Status: COMPLETED | OUTPATIENT
Start: 2024-01-27 | End: 2024-01-27

## 2024-01-27 RX ADMIN — SODIUM CHLORIDE 60 MILLILITER(S): 9 INJECTION, SOLUTION INTRAVENOUS at 11:01

## 2024-01-27 RX ADMIN — Medication 100 MILLIEQUIVALENT(S): at 13:00

## 2024-01-27 RX ADMIN — PANTOPRAZOLE SODIUM 40 MILLIGRAM(S): 20 TABLET, DELAYED RELEASE ORAL at 11:01

## 2024-01-27 RX ADMIN — ENOXAPARIN SODIUM 30 MILLIGRAM(S): 100 INJECTION SUBCUTANEOUS at 13:00

## 2024-01-27 RX ADMIN — SODIUM CHLORIDE 60 MILLILITER(S): 9 INJECTION, SOLUTION INTRAVENOUS at 17:17

## 2024-01-27 NOTE — PROGRESS NOTE ADULT - ASSESSMENT
A/P  # Right Plantar First Metatarsal Head Foot Ulcer, r/o Osteomyelitis  Wound is very deep with some purulence on gauze, but appears clean based  - Consult Podiatry  - TRISTEN/PVR is unable to be performed due to patient's lower extremity contraction  - Check A1C  - Follow up ESR, CRP  - Will hold off antibiotics as patient is not septic in case bone biopsy may be needed  - Noncontrast MRI of Right Foot to r/o Osteomyelitis  - family showed up late in day 1/27. MRI form was obtained  - pharmacy received, need med rec    # DVT PPx  - Lovenox    # FEN  - Speech and Swallow evaluation  - Monitor and replete electrolytes as needed  - IV Fluid Hydration  - Aspiration Precautions

## 2024-01-27 NOTE — CHART NOTE - NSCHARTNOTEFT_GEN_A_CORE
Spoke to daughter Lainey [phone] today, updated on clinical status, treatment plan/options and discharge plan/options, all questions answered    Attempted to do med recc however, pharmacy closed , will endorse for np to f/u monday 1/29/24

## 2024-01-27 NOTE — PROGRESS NOTE ADULT - SUBJECTIVE AND OBJECTIVE BOX
INTERVAL HPI/OVERNIGHT EVENTS:   Damaris cute distress, non verbal, family was unable to be reached- no contact information    REVIEW OF SYSTEMS:  unable to assess    Vital Signs Last 24 Hrs  T(C): 36.8 (27 Jan 2024 13:35), Max: 36.8 (26 Jan 2024 20:15)  T(F): 98.2 (27 Jan 2024 13:35), Max: 98.2 (26 Jan 2024 20:15)  HR: 65 (27 Jan 2024 13:35) (56 - 68)  BP: 158/61 (27 Jan 2024 13:35) (131/86 - 158/61)  BP(mean): 88 (27 Jan 2024 13:35) (88 - 88)  RR: 18 (27 Jan 2024 13:35) (17 - 18)  SpO2: 98% (27 Jan 2024 13:35) (98% - 100%)    Parameters below as of 27 Jan 2024 13:35  Patient On (Oxygen Delivery Method): room air        PHYSICAL EXAMINATION:  nonverbal, elderly, cachectic, lower extremities contracted, in no acute distress, heart regular, lungs clear. right first metatarsal wound                          10.3   3.96  )-----------( 202      ( 27 Jan 2024 07:56 )             31.2     01-27    137  |  105  |  8   ----------------------------<  86  3.3<L>   |  26  |  0.39<L>    Ca    9.0      27 Jan 2024 07:56  Phos  3.4     01-27  Mg     2.2     01-27    TPro  6.0  /  Alb  2.8<L>  /  TBili  0.6  /  DBili  x   /  AST  20  /  ALT  34  /  AlkPhos  119  01-27    LIVER FUNCTIONS - ( 27 Jan 2024 07:56 )  Alb: 2.8 g/dL / Pro: 6.0 g/dL / ALK PHOS: 119 U/L / ALT: 34 U/L DA / AST: 20 U/L / GGT: x                   CAPILLARY BLOOD GLUCOSE      RADIOLOGY & ADDITIONAL TESTS:

## 2024-01-27 NOTE — PROGRESS NOTE ADULT - SUBJECTIVE AND OBJECTIVE BOX
Interval: Patient seen resting in bed comfortably. Pending MRI. No acute overnight events.     HPI:  88F with unknown PMH presented to the ED with a foot wound. Pt seen at bedside, AAOX0, nonverbal. As per ED chart and signout, pt was dropped off by family but there is not contact info for the family that was given. Pt appears comfortable, afeb. Unable to obtain full history due to mental status (26 Jan 2024 06:13)        Medications enoxaparin Injectable 30 milliGRAM(s) SubCutaneous every 24 hours  lactated ringers. 1000 milliLiter(s) IV Continuous <Continuous>  ondansetron Injectable 4 milliGRAM(s) IV Push every 8 hours PRN  pantoprazole  Injectable 40 milliGRAM(s) IV Push daily    FH,   PMH   PSH    Labs                          9.8    7.87  )-----------( 234      ( 26 Jan 2024 04:34 )             30.9      01-26    139  |  108  |  16  ----------------------------<  126<H>  3.9   |  26  |  0.50    Ca    8.9      26 Jan 2024 04:34    TPro  6.0  /  Alb  2.9<L>  /  TBili  0.2  /  DBili  x   /  AST  33  /  ALT  43  /  AlkPhos  134<H>  01-26     Vital Signs Last 24 Hrs  T(C): 36.2 (27 Jan 2024 05:14), Max: 36.8 (26 Jan 2024 12:35)  T(F): 97.2 (27 Jan 2024 05:14), Max: 98.3 (26 Jan 2024 12:35)  HR: 56 (27 Jan 2024 05:14) (56 - 68)  BP: 149/58 (27 Jan 2024 05:14) (131/86 - 161/64)  BP(mean): --  RR: 18 (27 Jan 2024 05:14) (17 - 18)  SpO2: 99% (27 Jan 2024 05:14) (96% - 100%)    Parameters below as of 27 Jan 2024 05:14  Patient On (Oxygen Delivery Method): room air      Sedimentation Rate, Erythrocyte: 21 mm/Hr (01-26-24 @ 04:34)                PHYSICAL EXAM  LE Focused:    Derm: Wound to the 1st R submet measuring 2x2cm. Fibrogranular wound bed. Does not probe to bone. No erythema. No drainage or purulence.  Vasc: Faintly palpable pulses. Skin temperature noted to be warm to warm from proximal to distal b/l.   Neuro: Protective and epicritic sensation grossly intact   Musc: Contracted b/l LE. Muscle test deferred.     A:  1st  submet wound    P:   Patient seen and evaluated at bedside and Chart reviewed.  No leukocytosis with vitals stable.  Discussed diagnosis and treatment with patient  X-rays evaluated-final read  Obtained wound culture and sent to Lab  Dressed with santyl and allevyn  Patient to keep the dressing clean, dry and intact  MR pending-no family available to complete safety form  Recommended ID consult   Recommended Vasc consult if TRISTEN/PVR abnormal for healing potential of wound.   Pending TRISTEN/TBI/PVR   Ordered HbA1C  Reviewed ESR/CRP  Discussed importance of daily foot examinations and proper shoe gear and to importance of lower Fasting Blood Glucose levels.   Podiatry to follow while in house.  Seen with Dr. Hale

## 2024-01-28 LAB
ANION GAP SERPL CALC-SCNC: 6 MMOL/L — SIGNIFICANT CHANGE UP (ref 5–17)
BUN SERPL-MCNC: 8 MG/DL — SIGNIFICANT CHANGE UP (ref 7–18)
CALCIUM SERPL-MCNC: 8.3 MG/DL — LOW (ref 8.4–10.5)
CHLORIDE SERPL-SCNC: 103 MMOL/L — SIGNIFICANT CHANGE UP (ref 96–108)
CO2 SERPL-SCNC: 27 MMOL/L — SIGNIFICANT CHANGE UP (ref 22–31)
CREAT SERPL-MCNC: 0.43 MG/DL — LOW (ref 0.5–1.3)
EGFR: 93 ML/MIN/1.73M2 — SIGNIFICANT CHANGE UP
GLUCOSE BLDC GLUCOMTR-MCNC: 189 MG/DL — HIGH (ref 70–99)
GLUCOSE BLDC GLUCOMTR-MCNC: 88 MG/DL — SIGNIFICANT CHANGE UP (ref 70–99)
GLUCOSE SERPL-MCNC: 85 MG/DL — SIGNIFICANT CHANGE UP (ref 70–99)
MAGNESIUM SERPL-MCNC: 2.1 MG/DL — SIGNIFICANT CHANGE UP (ref 1.6–2.6)
PHOSPHATE SERPL-MCNC: 3.1 MG/DL — SIGNIFICANT CHANGE UP (ref 2.5–4.5)
POTASSIUM SERPL-MCNC: 3.4 MMOL/L — LOW (ref 3.5–5.3)
POTASSIUM SERPL-SCNC: 3.4 MMOL/L — LOW (ref 3.5–5.3)
SODIUM SERPL-SCNC: 136 MMOL/L — SIGNIFICANT CHANGE UP (ref 135–145)

## 2024-01-28 PROCEDURE — 99232 SBSQ HOSP IP/OBS MODERATE 35: CPT

## 2024-01-28 RX ORDER — DEXTROSE MONOHYDRATE, SODIUM CHLORIDE, AND POTASSIUM CHLORIDE 50; .745; 4.5 G/1000ML; G/1000ML; G/1000ML
1000 INJECTION, SOLUTION INTRAVENOUS
Refills: 0 | Status: DISCONTINUED | OUTPATIENT
Start: 2024-01-28 | End: 2024-01-29

## 2024-01-28 RX ORDER — ASPIRIN/CALCIUM CARB/MAGNESIUM 324 MG
300 TABLET ORAL DAILY
Refills: 0 | Status: DISCONTINUED | OUTPATIENT
Start: 2024-01-28 | End: 2024-01-29

## 2024-01-28 RX ORDER — LABETALOL HCL 100 MG
5 TABLET ORAL EVERY 8 HOURS
Refills: 0 | Status: DISCONTINUED | OUTPATIENT
Start: 2024-01-28 | End: 2024-01-29

## 2024-01-28 RX ADMIN — ENOXAPARIN SODIUM 30 MILLIGRAM(S): 100 INJECTION SUBCUTANEOUS at 15:08

## 2024-01-28 RX ADMIN — DEXTROSE MONOHYDRATE, SODIUM CHLORIDE, AND POTASSIUM CHLORIDE 75 MILLILITER(S): 50; .745; 4.5 INJECTION, SOLUTION INTRAVENOUS at 12:18

## 2024-01-28 RX ADMIN — PANTOPRAZOLE SODIUM 40 MILLIGRAM(S): 20 TABLET, DELAYED RELEASE ORAL at 12:21

## 2024-01-28 NOTE — PROGRESS NOTE ADULT - ASSESSMENT
A:  Right 1st  submet wound    P:   Patient seen and evaluated at bedside and Chart reviewed.  No leukocytosis with vitals stable.  Discussed diagnosis and treatment with patient  X-rays evaluated-final read  Obtained wound culture and sent to Lab  Dressed with santyl and allevyn  Patient to keep the dressing clean, dry and intact  MRI Unable to obtain 2/2 contractures  Recommended ID consult   Recommended Vasc consult if TRISTEN/PVR abnormal for healing potential of wound.   Pending TRISTEN/TBI/PVR   Ordered HbA1C  Reviewed ESR/CRP  Discussed importance of daily foot examinations and proper shoe gear and to importance of lower Fasting Blood Glucose levels.   Podiatry to follow while in house.    Seen, discussed and reviewed with Dr. Hale

## 2024-01-28 NOTE — PROGRESS NOTE ADULT - SUBJECTIVE AND OBJECTIVE BOX
INTERVAL HPI/OVERNIGHT EVENTS:   No acute distress, still unable to take history.  Patient's daughter, Lainey came to bedside today  - endorsing taht she is in a contracted state after a stroke and 5 mnths at [facility]  - Endorsed that her ulcer was seen by podiatry and was sent to hospital for IV antibiotics    REVIEW OF SYSTEMS:  Unable to assess    Vital Signs Last 24 Hrs  T(C): 36.7 (28 Jan 2024 13:05), Max: 36.9 (27 Jan 2024 21:00)  T(F): 98.1 (28 Jan 2024 13:05), Max: 98.5 (27 Jan 2024 21:00)  HR: 78 (28 Jan 2024 15:15) (56 - 78)  BP: 170/79 (28 Jan 2024 15:15) (133/61 - 175/71)  BP(mean): 106 (28 Jan 2024 13:05) (76 - 106)  RR: 18 (28 Jan 2024 13:05) (17 - 18)  SpO2: 95% (28 Jan 2024 13:05) (95% - 97%)    Parameters below as of 28 Jan 2024 13:05  Patient On (Oxygen Delivery Method): room air        PHYSICAL EXAMINATION:  elderly, frail, nonverbal, in norma cute distress. perisstent right first metatarsal wound  legs contracted, heart regular                        10.3   3.96  )-----------( 202      ( 27 Jan 2024 07:56 )             31.2     01-28    136  |  103  |  8   ----------------------------<  85  3.4<L>   |  27  |  0.43<L>    Ca    8.3<L>      28 Jan 2024 07:02  Phos  3.1     01-28  Mg     2.1     01-28    TPro  6.0  /  Alb  2.8<L>  /  TBili  0.6  /  DBili  x   /  AST  20  /  ALT  34  /  AlkPhos  119  01-27    LIVER FUNCTIONS - ( 27 Jan 2024 07:56 )  Alb: 2.8 g/dL / Pro: 6.0 g/dL / ALK PHOS: 119 U/L / ALT: 34 U/L DA / AST: 20 U/L / GGT: x                   CAPILLARY BLOOD GLUCOSE      RADIOLOGY & ADDITIONAL TESTS:                     INTERVAL HPI/OVERNIGHT EVENTS:   No acute distress, still unable to take history.  Patient's daughter, Lainey came to bedside today  - endorsing taht she is in a contracted state after a stroke and 5 mnths at [facility]  - Endorsed that her ulcer was seen by podiatry and was sent to hospital for IV antibiotics  Daughter, Lainey was feeding mother yesterday. We informed her that patient cannot eat until she gets a swallow eval due to possible aspiration. Lainey endorsed understanding yesterday saying I know it is protocol but I have been feeding my mother for years.  Lainey was again feeding patient today.    REVIEW OF SYSTEMS:  Unable to assess    Vital Signs Last 24 Hrs  T(C): 36.7 (28 Jan 2024 13:05), Max: 36.9 (27 Jan 2024 21:00)  T(F): 98.1 (28 Jan 2024 13:05), Max: 98.5 (27 Jan 2024 21:00)  HR: 78 (28 Jan 2024 15:15) (56 - 78)  BP: 170/79 (28 Jan 2024 15:15) (133/61 - 175/71)  BP(mean): 106 (28 Jan 2024 13:05) (76 - 106)  RR: 18 (28 Jan 2024 13:05) (17 - 18)  SpO2: 95% (28 Jan 2024 13:05) (95% - 97%)    Parameters below as of 28 Jan 2024 13:05  Patient On (Oxygen Delivery Method): room air        PHYSICAL EXAMINATION:  elderly, frail, nonverbal, in norma cute distress. perisstent right first metatarsal wound  legs contracted, heart regular                        10.3   3.96  )-----------( 202      ( 27 Jan 2024 07:56 )             31.2     01-28    136  |  103  |  8   ----------------------------<  85  3.4<L>   |  27  |  0.43<L>    Ca    8.3<L>      28 Jan 2024 07:02  Phos  3.1     01-28  Mg     2.1     01-28    TPro  6.0  /  Alb  2.8<L>  /  TBili  0.6  /  DBili  x   /  AST  20  /  ALT  34  /  AlkPhos  119  01-27    LIVER FUNCTIONS - ( 27 Jan 2024 07:56 )  Alb: 2.8 g/dL / Pro: 6.0 g/dL / ALK PHOS: 119 U/L / ALT: 34 U/L DA / AST: 20 U/L / GGT: x                   CAPILLARY BLOOD GLUCOSE      RADIOLOGY & ADDITIONAL TESTS:

## 2024-01-28 NOTE — PROGRESS NOTE ADULT - ASSESSMENT
A/P  # Right Plantar First Metatarsal Head Foot Ulcer, r/o Osteomyelitis  Wound is very deep with some purulence on gauze, but appears clean based  - Consult Podiatry- they want ID consult  - TRISTEN/PVR is unable to be performed due to patient's lower extremity contraction  - MRI is unable to be performed due to lower extremity contraction  - Follow up ESR, CRP- which are both unremarkable, ESR is only slightly elevated  - Will hold off antibiotics as patient is not septic. There are no signs of systemic infection at this time. Per podiatry note- they obtained culture but not showing up.  - ID consult in AM to further help with directing antibiotic treatment.  - Noncontrast MRI of Right Foot to r/o Osteomyelitis- cannot be performed due to her contractures  - pharmacy received, need med rec    # DVT PPx  - Lovenox    # FEN  - Speech and Swallow evaluation  - Monitor and replete electrolytes as needed  - IV Fluid Hydration  - Aspiration Precautions     A/P  # Right Plantar First Metatarsal Head Foot Ulcer, r/o Osteomyelitis  Wound is very deep with some purulence on gauze, but appears clean based  - Consult Podiatry- they want ID consult  - TRISTEN/PVR is unable to be performed due to patient's lower extremity contraction  - MRI is unable to be performed due to lower extremity contraction  - Follow up ESR, CRP- which are both unremarkable, ESR is only slightly elevated  - Will hold off antibiotics as patient is not septic. There are no signs of systemic infection at this time. Per podiatry note- they obtained culture but not showing up.  - ID consult in AM to further help with directing antibiotic treatment.  - Noncontrast MRI of Right Foot to r/o Osteomyelitis- cannot be performed due to her contractures  - pharmacy received, need med rec    #CVA  - hold oral meds until speecha nd swallow eval, they were unable to be reached today  - resume asa suppository    #HTN  - asymptomatic hypertension, holding oral meds until speech and swallow    # DVT PPx  - Lovenox    # FEN  - Speech and Swallow evaluation  - Monitor and replete electrolytes as needed  - IV Fluid Hydration  - Aspiration Precautions

## 2024-01-28 NOTE — PROGRESS NOTE ADULT - SUBJECTIVE AND OBJECTIVE BOX
Interval: Patient seen resting in bed comfortably. Unable to get MRI 2/2 contractures.  No acute overnight events.     HPI:  88F with unknown PMH presented to the ED with a foot wound. Pt seen at bedside, AAOX0, nonverbal. As per ED chart and signout, pt was dropped off by family but there is not contact info for the family that was given. Pt appears comfortable, afeb. Unable to obtain full history due to mental status (26 Jan 2024 06:13)      Medications dextrose 5% + sodium chloride 0.9% with potassium chloride 20 mEq/L 1000 milliLiter(s) IV Continuous <Continuous>  enoxaparin Injectable 30 milliGRAM(s) SubCutaneous every 24 hours  ondansetron Injectable 4 milliGRAM(s) IV Push every 8 hours PRN  pantoprazole  Injectable 40 milliGRAM(s) IV Push daily    FH,   PMH   PSH    Labs                          10.3   3.96  )-----------( 202      ( 27 Jan 2024 07:56 )             31.2      01-28    136  |  103  |  8   ----------------------------<  85  3.4<L>   |  27  |  0.43<L>    Ca    8.3<L>      28 Jan 2024 07:02  Phos  3.1     01-28  Mg     2.1     01-28    TPro  6.0  /  Alb  2.8<L>  /  TBili  0.6  /  DBili  x   /  AST  20  /  ALT  34  /  AlkPhos  119  01-27     Vital Signs Last 24 Hrs  T(C): 36.4 (28 Jan 2024 04:39), Max: 36.9 (27 Jan 2024 21:00)  T(F): 97.5 (28 Jan 2024 04:39), Max: 98.5 (27 Jan 2024 21:00)  HR: 75 (28 Jan 2024 08:43) (56 - 75)  BP: 160/70 (28 Jan 2024 08:43) (133/61 - 171/67)  BP(mean): 102 (28 Jan 2024 04:39) (76 - 102)  RR: 18 (28 Jan 2024 05:00) (17 - 18)  SpO2: 97% (28 Jan 2024 05:00) (96% - 98%)    Parameters below as of 28 Jan 2024 05:00  Patient On (Oxygen Delivery Method): room air      Sedimentation Rate, Erythrocyte: 21 mm/Hr (01-27-24 @ 07:56)  Sedimentation Rate, Erythrocyte: 21 mm/Hr (01-26-24 @ 04:34)         C-Reactive Protein, Serum: <3 mg/L (01-27-24 @ 07:56)       PHYSICAL EXAM  LE Focused:    Derm: Wound to the 1st R submet measuring 2x2cm. Fibrogranular wound bed. Does not probe to bone. No erythema. No drainage or purulence.  Vasc: Faintly palpable pulses. Skin temperature noted to be warm to warm from proximal to distal b/l.   Neuro: Protective and epicritic sensation grossly intact   Musc: Contracted b/l LE. Muscle test deferred.     < from: Xray Foot AP + Lateral, Right (01.26.24 @ 02:09) >    INTERPRETATION:  Right foot    HISTORY: Foot wound     Three views of the right foot show loss of joint space of the first   metatarsophalangeal joint with medial erosion of the distal first   metatarsal. Vascular calcifications are present. Diffuse osteopenia is   present. The joint spaces are otherwise maintained.    IMPRESSION: Possible osteomyelitis, first metatarsal.    Further information may be obtained from the patient's subsequent MRI of   the right foot which was pending at the time of this dictation.      Thank you for this referral.    --- End of Report ---              < end of copied text >

## 2024-01-29 VITALS
SYSTOLIC BLOOD PRESSURE: 171 MMHG | OXYGEN SATURATION: 97 % | HEART RATE: 59 BPM | DIASTOLIC BLOOD PRESSURE: 82 MMHG | RESPIRATION RATE: 18 BRPM | TEMPERATURE: 98 F

## 2024-01-29 LAB
ALBUMIN SERPL ELPH-MCNC: 2.7 G/DL — LOW (ref 3.5–5)
ALP SERPL-CCNC: 113 U/L — SIGNIFICANT CHANGE UP (ref 40–120)
ALT FLD-CCNC: 23 U/L DA — SIGNIFICANT CHANGE UP (ref 10–60)
ANION GAP SERPL CALC-SCNC: 6 MMOL/L — SIGNIFICANT CHANGE UP (ref 5–17)
AST SERPL-CCNC: 14 U/L — SIGNIFICANT CHANGE UP (ref 10–40)
BILIRUB SERPL-MCNC: 0.4 MG/DL — SIGNIFICANT CHANGE UP (ref 0.2–1.2)
BUN SERPL-MCNC: 7 MG/DL — SIGNIFICANT CHANGE UP (ref 7–18)
CALCIUM SERPL-MCNC: 8.3 MG/DL — LOW (ref 8.4–10.5)
CHLORIDE SERPL-SCNC: 109 MMOL/L — HIGH (ref 96–108)
CO2 SERPL-SCNC: 25 MMOL/L — SIGNIFICANT CHANGE UP (ref 22–31)
CREAT SERPL-MCNC: 0.47 MG/DL — LOW (ref 0.5–1.3)
EGFR: 92 ML/MIN/1.73M2 — SIGNIFICANT CHANGE UP
GLUCOSE BLDC GLUCOMTR-MCNC: 116 MG/DL — HIGH (ref 70–99)
GLUCOSE BLDC GLUCOMTR-MCNC: 121 MG/DL — HIGH (ref 70–99)
GLUCOSE BLDC GLUCOMTR-MCNC: 121 MG/DL — HIGH (ref 70–99)
GLUCOSE BLDC GLUCOMTR-MCNC: 133 MG/DL — HIGH (ref 70–99)
GLUCOSE SERPL-MCNC: 119 MG/DL — HIGH (ref 70–99)
HCT VFR BLD CALC: 33.2 % — LOW (ref 34.5–45)
HGB BLD-MCNC: 10.7 G/DL — LOW (ref 11.5–15.5)
MAGNESIUM SERPL-MCNC: 2 MG/DL — SIGNIFICANT CHANGE UP (ref 1.6–2.6)
MCHC RBC-ENTMCNC: 25.4 PG — LOW (ref 27–34)
MCHC RBC-ENTMCNC: 32.2 GM/DL — SIGNIFICANT CHANGE UP (ref 32–36)
MCV RBC AUTO: 78.7 FL — LOW (ref 80–100)
NRBC # BLD: 0 /100 WBCS — SIGNIFICANT CHANGE UP (ref 0–0)
PHOSPHATE SERPL-MCNC: 2.9 MG/DL — SIGNIFICANT CHANGE UP (ref 2.5–4.5)
PLATELET # BLD AUTO: 204 K/UL — SIGNIFICANT CHANGE UP (ref 150–400)
POTASSIUM SERPL-MCNC: 3.9 MMOL/L — SIGNIFICANT CHANGE UP (ref 3.5–5.3)
POTASSIUM SERPL-SCNC: 3.9 MMOL/L — SIGNIFICANT CHANGE UP (ref 3.5–5.3)
PROT SERPL-MCNC: 6 G/DL — SIGNIFICANT CHANGE UP (ref 6–8.3)
RBC # BLD: 4.22 M/UL — SIGNIFICANT CHANGE UP (ref 3.8–5.2)
RBC # FLD: 15.1 % — HIGH (ref 10.3–14.5)
SODIUM SERPL-SCNC: 140 MMOL/L — SIGNIFICANT CHANGE UP (ref 135–145)
WBC # BLD: 4.36 K/UL — SIGNIFICANT CHANGE UP (ref 3.8–10.5)
WBC # FLD AUTO: 4.36 K/UL — SIGNIFICANT CHANGE UP (ref 3.8–10.5)

## 2024-01-29 PROCEDURE — 92610 EVALUATE SWALLOWING FUNCTION: CPT

## 2024-01-29 PROCEDURE — 83735 ASSAY OF MAGNESIUM: CPT

## 2024-01-29 PROCEDURE — 36415 COLL VENOUS BLD VENIPUNCTURE: CPT

## 2024-01-29 PROCEDURE — 80048 BASIC METABOLIC PNL TOTAL CA: CPT

## 2024-01-29 PROCEDURE — 85025 COMPLETE CBC W/AUTO DIFF WBC: CPT

## 2024-01-29 PROCEDURE — 83690 ASSAY OF LIPASE: CPT

## 2024-01-29 PROCEDURE — 73620 X-RAY EXAM OF FOOT: CPT

## 2024-01-29 PROCEDURE — 84100 ASSAY OF PHOSPHORUS: CPT

## 2024-01-29 PROCEDURE — 99285 EMERGENCY DEPT VISIT HI MDM: CPT

## 2024-01-29 PROCEDURE — 99222 1ST HOSP IP/OBS MODERATE 55: CPT

## 2024-01-29 PROCEDURE — 85027 COMPLETE CBC AUTOMATED: CPT

## 2024-01-29 PROCEDURE — 85652 RBC SED RATE AUTOMATED: CPT

## 2024-01-29 PROCEDURE — 86140 C-REACTIVE PROTEIN: CPT

## 2024-01-29 PROCEDURE — 80053 COMPREHEN METABOLIC PANEL: CPT

## 2024-01-29 PROCEDURE — 83036 HEMOGLOBIN GLYCOSYLATED A1C: CPT

## 2024-01-29 PROCEDURE — 99239 HOSP IP/OBS DSCHRG MGMT >30: CPT

## 2024-01-29 PROCEDURE — 82962 GLUCOSE BLOOD TEST: CPT

## 2024-01-29 RX ADMIN — PANTOPRAZOLE SODIUM 40 MILLIGRAM(S): 20 TABLET, DELAYED RELEASE ORAL at 12:48

## 2024-01-29 RX ADMIN — ENOXAPARIN SODIUM 30 MILLIGRAM(S): 100 INJECTION SUBCUTANEOUS at 14:54

## 2024-01-29 RX ADMIN — DEXTROSE MONOHYDRATE, SODIUM CHLORIDE, AND POTASSIUM CHLORIDE 75 MILLILITER(S): 50; .745; 4.5 INJECTION, SOLUTION INTRAVENOUS at 01:49

## 2024-01-29 NOTE — SWALLOW BEDSIDE ASSESSMENT ADULT - ORAL PHASE
unable to assess d/t poor participation/comprehension Decreased anterior-posterior movement of the bolus/Delayed oral transit time

## 2024-01-29 NOTE — PROGRESS NOTE ADULT - ASSESSMENT
A:  Right 1st  submet wound    P:   Patient seen and evaluated at bedside and Chart reviewed.  No leukocytosis with vitals stable.  Discussed diagnosis and treatment with patient  X-rays evaluated-final read  Obtained wound culture and sent to Lab- pending results   Dressed with santyl and allevyn  Patient to keep the dressing clean, dry and intact  MRI Unable to obtain 2/2 contractures  Recommended ID consult   Unable to conduct TRISTEN/TBI/PVR due to contractors  Reviewed HbA1C- 5.7  Reviewed ESR/CRP  Discussed importance of daily foot examinations and proper shoe gear and to importance of lower Fasting Blood Glucose levels.   Podiatry to follow while in house.  Discussed, seen and treated with Dr. Prasad  Discussed with attending Dr. Hale A:  Right 1st  submet wound    P:   Patient seen and evaluated at bedside and Chart reviewed.  No leukocytosis with vitals stable.  X-rays evaluated-final read  Obtained wound culture and sent to Lab- pending results   Dressed with santyl and allevyn  Patient to keep the dressing clean, dry and intact  MRI Unable to obtain 2/2 contractures  Recommended ID consult   Unable to conduct TRISTEN/TBI/PVR due to contractors  Reviewed HbA1C- 5.7  Reviewed ESR/CRP  Discussed importance of daily foot examinations and proper shoe gear and to importance of lower Fasting Blood Glucose levels.   Podiatry to follow while in house.  Discussed, seen and treated with Dr. Prasad  Discussed with attending Dr. Hale

## 2024-01-29 NOTE — SWALLOW BEDSIDE ASSESSMENT ADULT - ORAL PREPARATORY PHASE
Refuses to accept bolus into oral cavity lingual pumping, poor labial seal, bolus holding/Anterior loss of bolus/Decreased mastication ability

## 2024-01-29 NOTE — DISCHARGE NOTE PROVIDER - NSDCCPCAREPLAN_GEN_ALL_CORE_FT
PRINCIPAL DISCHARGE DIAGNOSIS  Diagnosis: Wound of foot  Assessment and Plan of Treatment: Patient was sent by podiatry due to an ulcer under the right foot. Hospital podiatry team was consulted and recommended further testing- TRISTEN and MRI. Sydnee unfortunately was unable to undergo these tests due to contractures in her lower extremities. Sydnee was then evaluated by infectious disease physician and wound care. There are no signs of systemic infection. No antibiotics indicated. Infectious Disease and Podiatry recommending local wound care. Stable for discharge, please followup with outpatient podiatry.      SECONDARY DISCHARGE DIAGNOSES  Diagnosis: HTN (hypertension)  Assessment and Plan of Treatment: We did not continue home medications due to refusal to participate in swallowing eval. If able to, continue home blood pressure medications.    Diagnosis: HLD (hyperlipidemia)  Assessment and Plan of Treatment: Continue with home lipid medications if able to at home    Diagnosis: Cerebrovascular accident (CVA)  Assessment and Plan of Treatment: Hx of CVA with lower extremity contractures. We attempted to feed Sydnee but was not participating. Attempted speech and swallow evaluation but Sydnee did not participate. It was not safe to feed patient in the hospital. Noted that when patient's daughter was here, patient would eat. Continue with antiplatelet and cholesterol medication.     PRINCIPAL DISCHARGE DIAGNOSIS  Diagnosis: Wound of foot  Assessment and Plan of Treatment: Patient was sent by podiatry due to an ulcer under the right foot. Hospital podiatry team was consulted and recommended further testing- TRISTEN and MRI. Sydnee unfortunately was unable to undergo these tests due to contractures in her lower extremities. Sydnee was then evaluated by infectious disease physician and wound care. There are no signs of systemic infection. No antibiotics indicated. Infectious Disease and Podiatry recommending local wound care. Stable for discharge, please followup with outpatient podiatry.  WOUND CARE INSTRUCTIONS:  -Dress with Santyl and Cassie      SECONDARY DISCHARGE DIAGNOSES  Diagnosis: HTN (hypertension)  Assessment and Plan of Treatment: We did not continue home medications due to refusal to participate in swallowing eval. If able to, continue home blood pressure medications.    Diagnosis: HLD (hyperlipidemia)  Assessment and Plan of Treatment: Continue with home lipid medications if able to at home    Diagnosis: Cerebrovascular accident (CVA)  Assessment and Plan of Treatment: Hx of CVA with lower extremity contractures. We attempted to feed Sydnee but was not participating. Attempted speech and swallow evaluation but Sydnee did not participate. It was not safe to feed patient in the hospital. Noted that when patient's daughter was here, patient would eat. Continue with antiplatelet and cholesterol medication.

## 2024-01-29 NOTE — PROGRESS NOTE ADULT - ASSESSMENT
A/P  # Right Plantar First Metatarsal Head Foot Ulcer, r/o Osteomyelitis  - Unable to obtain studies as recommended by podiatry- MR and ABIs due to patient's mental status and her lower extremity contractures  - Discussed with ID and again with podiatry, Dr. Bhatt  - Per ID, wound not appear to be infected. lab work without any signs of systemic infection- borderline esr, negative crp. No leukocytosis. No indications for antibiotics at this time.   - This was discussed with podiatry attending. Unfortunately due to patient's clinical condition and inability to obtain studies for management, could only recommend wound care.     #CVA  - Seen by speech and swallow, they were unable to complete their evaluation due to patient non-coperation    #HTN  - Unable to resume oral medications, could not complete speech and swallow    # DVT PPx  - Lovenox    # FEN  - Speech and Swallow evaluation  - Monitor and replete electrolytes as needed  - IV Fluid Hydration  - Aspiration Precautions

## 2024-01-29 NOTE — SWALLOW BEDSIDE ASSESSMENT ADULT - PHARYNGEAL PHASE
unable to assess d/t poor participation/comprehension delayed swallow trigger/Decreased laryngeal elevation

## 2024-01-29 NOTE — CONSULT NOTE ADULT - SUBJECTIVE AND OBJECTIVE BOX
HPI:  88F with unknown PMH presented to the ED with a foot wound. Pt seen at bedside, AAOX0, nonverbal. As per ED chart and signout, pt was dropped off by family but there is not contact info for the family that was given. Pt appears comfortable, afeb, VSS Unable to assess ROS due to mental status (26 Jan 2024 06:13). Asked to evaluate pt b/o ulcer near 1st metatarsal head on right.       PAST MEDICAL & SURGICAL HISTORY:      MEDS:  aspirin Suppository 300 milliGRAM(s) Rectal daily  dextrose 5% + sodium chloride 0.9% with potassium chloride 20 mEq/L 1000 milliLiter(s) IV Continuous <Continuous>  enoxaparin Injectable 30 milliGRAM(s) SubCutaneous every 24 hours  labetalol Injectable 5 milliGRAM(s) IV Push every 8 hours PRN  ondansetron Injectable 4 milliGRAM(s) IV Push every 8 hours PRN  pantoprazole  Injectable 40 milliGRAM(s) IV Push daily      ALLERGIES  Diucardin (Unknown)      SOCIAL HISTORY:    FAMILY HISTORY:      ROS:    General:	    Skin:  	  HEENT:    Respiratory and Thorax:  	  Cardiovascular:	    Abdomen:	    Genitourinary:	    Musculoskeletal:	    Neurological:	    Psychiatric:	    Hematology/Lymphatics:	    Endocrine:	    PHYSICAL EXAM:    Vital Signs Last 24 Hrs  T(C): 36.3 (29 Jan 2024 04:43), Max: 36.7 (28 Jan 2024 13:05)  T(F): 97.3 (29 Jan 2024 04:43), Max: 98.1 (28 Jan 2024 13:05)  HR: 59 (29 Jan 2024 04:43) (56 - 78)  BP: 159/65 (29 Jan 2024 04:43) (145/60 - 175/71)  BP(mean): 106 (28 Jan 2024 13:05) (106 - 106)  RR: 18 (29 Jan 2024 04:43) (17 - 18)  SpO2: 97% (29 Jan 2024 04:43) (95% - 97%)    Parameters below as of 29 Jan 2024 04:43  Patient On (Oxygen Delivery Method): room air          Gen:    HEENT:    Neck:    Lymph Nodes:    Breasts:    Back:    Chest/Thorax:    Cardiovascular:    Gastrointestinal:    Genitourinary:    Rectal:    Extremities:    Vascular:    Neurological:    Skin:    Psychiatric:    LABS/DIAGNOSTIC TESTS:                          10.7   4.36  )-----------( 204      ( 29 Jan 2024 05:50 )             33.2     WBC Count: 4.36 K/uL (01-29 @ 05:50)  WBC Count: 3.96 K/uL (01-27 @ 07:56)      01-29    140  |  109<H>  |  7   ----------------------------<  119<H>  3.9   |  25  |  0.47<L>    Ca    8.3<L>      29 Jan 2024 05:50  Phos  2.9     01-29  Mg     2.0     01-29    TPro  6.0  /  Alb  2.7<L>  /  TBili  0.4  /  DBili  x   /  AST  14  /  ALT  23  /  AlkPhos  113  01-29        LIVER FUNCTIONS - ( 29 Jan 2024 05:50 )  Alb: 2.7 g/dL / Pro: 6.0 g/dL / ALK PHOS: 113 U/L / ALT: 23 U/L DA / AST: 14 U/L / GGT: x                 LACTATE:        CULTURES:       RADIOLOGY               HPI (from chart):  88F with unknown PMH presented to the ED with a foot wound. Pt seen at bedside, AAOX0, nonverbal. As per ED chart and signout, pt was dropped off by family but there is not contact info for the family that was given. Pt appears comfortable, afeb, VSS Unable to assess ROS due to mental status (26 Jan 2024 06:13). Asked to evaluate pt b/o ulcer near 1st metatarsal head on right. At the time of my consult, pt does not communicate verbally.       PAST MEDICAL & SURGICAL HISTORY:      MEDS:  aspirin Suppository 300 milliGRAM(s) Rectal daily  dextrose 5% + sodium chloride 0.9% with potassium chloride 20 mEq/L 1000 milliLiter(s) IV Continuous <Continuous>  enoxaparin Injectable 30 milliGRAM(s) SubCutaneous every 24 hours  labetalol Injectable 5 milliGRAM(s) IV Push every 8 hours PRN  ondansetron Injectable 4 milliGRAM(s) IV Push every 8 hours PRN  pantoprazole  Injectable 40 milliGRAM(s) IV Push daily      ALLERGIES  Diucardin (Unknown)      SOCIAL HISTORY: see above    FAMILY HISTORY: cd not obtain      ROS: cd not obtain, as pt is not talking       PHYSICAL EXAM:    Vital Signs Last 24 Hrs  T(C): 36.3 (29 Jan 2024 04:43), Max: 36.7 (28 Jan 2024 13:05)  T(F): 97.3 (29 Jan 2024 04:43), Max: 98.1 (28 Jan 2024 13:05)  HR: 59 (29 Jan 2024 04:43) (56 - 78)  BP: 159/65 (29 Jan 2024 04:43) (145/60 - 175/71)  BP(mean): 106 (28 Jan 2024 13:05) (106 - 106)  RR: 18 (29 Jan 2024 04:43) (17 - 18)  SpO2: 97% (29 Jan 2024 04:43) (95% - 97%)    Parameters below as of 29 Jan 2024 04:43  Patient On (Oxygen Delivery Method): room air          Gen: alert but non-communicative    HEENT: NC/AT; + blepharitisw    Lymph Nodes: no enlarged submand, cervical or supraclav LNs    Chest/Thorax: poor inspiratory effort    Cardiovascular: L5C1vze with III/VI Systolic murmur best heard at apex    ABD: BS active; soft and non-tender to palpation    Genitourinary: + primafit in place    Rectal: no coco-rectal lesions    Extremities: contracted; bilat, vertical patellar scars  R foot: 3kow5wv ulcer plantar surface of 1st metatarsal with no surrounding erythema and no drainage; non-tender to palpation  L foot: dorsal aspect of 4th and 5th toes with erythema    Neurological: alert but non-verbal    Skin: no buttock or sacral breakdown        LABS/DIAGNOSTIC TESTS:                          10.7   4.36  )-----------( 204      ( 29 Jan 2024 05:50 )             33.2     WBC Count: 4.36 K/uL (01-29 @ 05:50)  WBC Count: 3.96 K/uL (01-27 @ 07:56)      01-29    140  |  109<H>  |  7   ----------------------------<  119<H>  3.9   |  25  |  0.47<L>    Ca    8.3<L>      29 Jan 2024 05:50  Phos  2.9     01-29  Mg     2.0     01-29    TPro  6.0  /  Alb  2.7<L>  /  TBili  0.4  /  DBili  x   /  AST  14  /  ALT  23  /  AlkPhos  113  01-29        LIVER FUNCTIONS - ( 29 Jan 2024 05:50 )  Alb: 2.7 g/dL / Pro: 6.0 g/dL / ALK PHOS: 113 U/L / ALT: 23 U/L DA / AST: 14 U/L / GGT: x           Sedimentation Rate, Erythrocyte (01.27.24 @ 07:56)   Sedimentation Rate, Erythrocyte: 21 mm/Hr        CULTURES:       RADIOLOGY  < from: Xray Foot AP + Lateral, Right (01.26.24 @ 02:09) >  ACC: [id number] EXAM:  XR FOOT 2 VIEWS RT   ORDERED BY: DARWIN MORIN     PROCEDURE DATE:  01/26/2024          INTERPRETATION:  Right foot    HISTORY: Foot wound     Three views of the right foot show loss of joint space of the first   metatarsophalangeal joint with medial erosion of the distal first   metatarsal. Vascular calcifications are present. Diffuse osteopenia is   present. The joint spaces are otherwise maintained.    IMPRESSION: Possible osteomyelitis, first metatarsal.    Further information may be obtained from the patient's subsequent MRI of   the right foot which was pending at the time of this dictation.

## 2024-01-29 NOTE — CONSULT NOTE ADULT - TIME BILLING
--Chart review, including notes/labs/imaging  --examination at the bedside  --discussion of management of ulcer with Dr. Liang (see above)     Please call again if needed.

## 2024-01-29 NOTE — SWALLOW BEDSIDE ASSESSMENT ADULT - SWALLOW EVAL: DIAGNOSIS
Pt p/w oropharyngeal dysphagia c/b poor labial seal, lingual pumping, bolus holding, impaired mastication 2/2 lack of dentition, decreased A-P transport, reduced hyolaryngeal elevation, and decreased swallow trigger. No overt s&s of penetration/aspiration was observed during this eval.
The scribe's documentation has been prepared under my direction and personally reviewed by me in its entirety. I confirm that the note above accurately reflects all work, treatment, procedures, and medical decision making performed by me.  Rayne Huang MD

## 2024-01-29 NOTE — ADVANCED PRACTICE NURSE CONSULT - ASSESSMENT
This is a 88yr old female patient admitted for Foot Injury, presenting with the following:  -There is a Stage 1 Pressure Injury to the Coccyx and Bilateral Heels, as evident by non-blanchable erythema  -There is a R. Hallux Stage 3 Pressure injury, to which the patient is being followed by Podiatry with a treatment plan in place to address this issue

## 2024-01-29 NOTE — SWALLOW BEDSIDE ASSESSMENT ADULT - SLP PERTINENT HISTORY OF CURRENT PROBLEM
As per chart review, pt is a 88F with unknown PMH presented to the ED with a foot wound. Pt seen at bedside, AAOX0, nonverbal. As per ED chart and signout, pt was dropped off by family but there is not contact info for the family that was given. Pt appears comfortable, afeb, VSS Unable to assess ROS due to mental status.

## 2024-01-29 NOTE — DISCHARGE NOTE PROVIDER - NSDCMRMEDTOKEN_GEN_ALL_CORE_FT
amLODIPine 10 mg oral tablet: 1 tab(s) orally once a day  labetalol 100 mg oral tablet: 1 tab(s) orally 2 times a day  losartan 25 mg oral tablet: 1 tab(s) orally once a day  Plavix 75 mg oral tablet: 1 tab(s) orally once a day

## 2024-01-29 NOTE — DISCHARGE NOTE PROVIDER - HOSPITAL COURSE
Patient is a 88 year old female, from home, bedbound, nonverbal hx of HTN, HLD, CVA, contractures who was originally dropped off due to a foot ulcer. Initial stages of hospitalization complicated by lack of information, but patient's daughter then showed up after admission. She had visiting podiatry who recommended patient to visit hospital due to foot wound. Patient was seen by Podiatry, recommended ABIs and MRI. These could not be performed due to patient's mental status and contracted lower extremities. She was seen by wound care and infectious disease. patient had no systemic signs of infection. No abx was recommended. Patient was noted with poor mentation, at baseline. Attempted bedside dysphagia, patient did not participate. Attempted speech and swallow, patient did not tolerate. patient kept npo, but patient's daughter, Lainey noted feeding patient despite explanation of risks. Discussed hospital course and consultants recommendations with patient's son, adrienne carvajal. We came to plan of discharging patient home. She also normally follows [facility], and will continue following care there.

## 2024-01-29 NOTE — DISCHARGE NOTE NURSING/CASE MANAGEMENT/SOCIAL WORK - PATIENT PORTAL LINK FT
You can access the FollowMyHealth Patient Portal offered by [facility] by registering at the following website: [email or web address]. By joining [facility]’s FollowMyHealth portal, you will also be able to view your health information using other applications (apps) compatible with our system.

## 2024-01-29 NOTE — CONSULT NOTE ADULT - ASSESSMENT
88F with unknown PMH presented to the ED with a R metatarsal foot wound. Pt appears comfortable; no fever, no elevated WBC, minimally elevated ESR. Physical examination reveals ulcer over the plantar aspect of the 1st R metatarsal with no evidence of skin/soft tissue infection. Treat using local wound care. No Abs and no further w/u for ulcer/deep infection recommended at this time.

## 2024-01-29 NOTE — PROGRESS NOTE ADULT - SUBJECTIVE AND OBJECTIVE BOX
Interval: Patient seen resting in bed comfortably. Changed dressing today. No acute overnight events.     HPI:  88F with unknown PMH presented to the ED with a foot wound. Pt seen at bedside, AAOX0, nonverbal. As per ED chart and signout, pt was dropped off by family but there is not contact info for the family that was given. Pt appears comfortable, afeb. Unable to obtain full history due to mental status (26 Jan 2024 06:13)    Medications aspirin Suppository 300 milliGRAM(s) Rectal daily  dextrose 5% + sodium chloride 0.9% with potassium chloride 20 mEq/L 1000 milliLiter(s) IV Continuous <Continuous>  enoxaparin Injectable 30 milliGRAM(s) SubCutaneous every 24 hours  labetalol Injectable 5 milliGRAM(s) IV Push every 8 hours PRN  ondansetron Injectable 4 milliGRAM(s) IV Push every 8 hours PRN  pantoprazole  Injectable 40 milliGRAM(s) IV Push daily    FH,   PMH   PSH    Labs                          10.7   4.36  )-----------( 204      ( 29 Jan 2024 05:50 )             33.2      01-29    140  |  109<H>  |  7   ----------------------------<  119<H>  3.9   |  25  |  0.47<L>    Ca    8.3<L>      29 Jan 2024 05:50  Phos  2.9     01-29  Mg     2.0     01-29    TPro  6.0  /  Alb  2.7<L>  /  TBili  0.4  /  DBili  x   /  AST  14  /  ALT  23  /  AlkPhos  113  01-29     Vital Signs Last 24 Hrs  T(C): 36.3 (29 Jan 2024 04:43), Max: 36.7 (28 Jan 2024 13:05)  T(F): 97.3 (29 Jan 2024 04:43), Max: 98.1 (28 Jan 2024 13:05)  HR: 59 (29 Jan 2024 04:43) (56 - 78)  BP: 159/65 (29 Jan 2024 04:43) (145/60 - 175/71)  BP(mean): 106 (28 Jan 2024 13:05) (106 - 106)  RR: 18 (29 Jan 2024 04:43) (17 - 18)  SpO2: 97% (29 Jan 2024 04:43) (95% - 97%)    Parameters below as of 29 Jan 2024 04:43  Patient On (Oxygen Delivery Method): room air      Sedimentation Rate, Erythrocyte: 21 mm/Hr (01-27-24 @ 07:56)  Sedimentation Rate, Erythrocyte: 21 mm/Hr (01-26-24 @ 04:34)         C-Reactive Protein, Serum: <3 mg/L (01-27-24 @ 07:56)   WBC Count: 4.36 K/uL (01-29-24 @ 05:50)      ROS: Unremarkable outside HPI       PHYSICAL EXAM  LE Focused:    Derm: Wound to the 1st R submet measuring 2x2cm. Fibrogranular wound bed. Does not probe to bone. No erythema. No drainage or purulence.  Vasc: Faintly palpable pulses. Skin temperature noted to be warm to warm from proximal to distal b/l.   Neuro: Protective and epicritic sensation grossly intact   Musc: Contracted b/l LE. Muscle test deferred.     < from: Xray Foot AP + Lateral, Right (01.26.24 @ 02:09) >    INTERPRETATION:  Right foot    HISTORY: Foot wound     Three views of the right foot show loss of joint space of the first   metatarsophalangeal joint with medial erosion of the distal first   metatarsal. Vascular calcifications are present. Diffuse osteopenia is   present. The joint spaces are otherwise maintained.    IMPRESSION: Possible osteomyelitis, first metatarsal.    Further information may be obtained from the patient's subsequent MRI of   the right foot which was pending at the time of this dictation.      Thank you for this referral.    --- End of Report ---              < end of copied text >

## 2024-01-29 NOTE — PROGRESS NOTE ADULT - SUBJECTIVE AND OBJECTIVE BOX
INTERVAL HPI/OVERNIGHT EVENTS:   Justine nd examined in the AM, son Darrell at bedside. Obtained more collateral. She was sent by podiatry due to an infection in her foot. We had a long discussion regarding current plan of care. Darrell was told patient needs antibiotics by visiting podiatry. I explained that there is no indication at this time, no signs of systemic infection. Uncertain if iv antibiotics will reach ulcer site given inability to complete guilherme studies. Seen by ID and podiatry, no recommendations for antibiotics. No other intervention offered by podiatry except local wound care. Plan for dc home.    REVIEW OF SYSTEMS:  Unable to assess    Vital Signs Last 24 Hrs  T(C): 36.8 (29 Jan 2024 13:40), Max: 36.8 (29 Jan 2024 13:40)  T(F): 98.2 (29 Jan 2024 13:40), Max: 98.2 (29 Jan 2024 13:40)  HR: 59 (29 Jan 2024 13:40) (56 - 59)  BP: 171/82 (29 Jan 2024 13:40) (145/60 - 171/82)  BP(mean): 112 (29 Jan 2024 13:40) (112 - 112)  RR: 18 (29 Jan 2024 13:40) (17 - 18)  SpO2: 97% (29 Jan 2024 13:40) (96% - 97%)    Parameters below as of 29 Jan 2024 13:40  Patient On (Oxygen Delivery Method): room air        PHYSICAL EXAMINATION:  elderly, frail, nonverbal, in norma cute distress. perisstent right first metatarsal wound  legs contracted, heart regular                                     10.7   4.36  )-----------( 204      ( 29 Jan 2024 05:50 )             33.2     01-29    140  |  109<H>  |  7   ----------------------------<  119<H>  3.9   |  25  |  0.47<L>    Ca    8.3<L>      29 Jan 2024 05:50  Phos  2.9     01-29  Mg     2.0     01-29    TPro  6.0  /  Alb  2.7<L>  /  TBili  0.4  /  DBili  x   /  AST  14  /  ALT  23  /  AlkPhos  113  01-29    LIVER FUNCTIONS - ( 29 Jan 2024 05:50 )  Alb: 2.7 g/dL / Pro: 6.0 g/dL / ALK PHOS: 113 U/L / ALT: 23 U/L DA / AST: 14 U/L / GGT: x                   CAPILLARY BLOOD GLUCOSE      RADIOLOGY & ADDITIONAL TESTS:

## 2024-01-29 NOTE — DISCHARGE NOTE PROVIDER - ATTENDING DISCHARGE PHYSICAL EXAMINATION:
Seen and examined withpatient's son, Darrell at bedside this morning. Discussed patients overall health and foot wound. No antibiotics given as no systemic signs of infection. Infectious disease and podiatry were followed up with. Discussed with ID, Dr. Teran and podiatry, Dr. Hale. No further interventions can be offered at this time, patient unable to tolerate evaluation such as TRISTEN or MRI. remains nonverbal, eyes tracking, lower ext contracted. Stable right foot wound.

## 2024-03-24 RX ORDER — ATORVASTATIN CALCIUM 80 MG/1
1 TABLET, FILM COATED ORAL
Refills: 0 | DISCHARGE

## 2024-03-24 RX ORDER — LABETALOL HCL 100 MG
1 TABLET ORAL
Refills: 0 | DISCHARGE

## 2024-03-24 RX ORDER — LOSARTAN POTASSIUM 100 MG/1
1 TABLET, FILM COATED ORAL
Refills: 0 | DISCHARGE

## 2024-03-24 RX ORDER — AMLODIPINE BESYLATE 2.5 MG/1
1 TABLET ORAL
Refills: 0 | DISCHARGE

## 2024-03-24 RX ORDER — CLOPIDOGREL BISULFATE 75 MG/1
1 TABLET, FILM COATED ORAL
Refills: 0 | DISCHARGE